# Patient Record
Sex: MALE | Race: WHITE | NOT HISPANIC OR LATINO | Employment: OTHER | URBAN - METROPOLITAN AREA
[De-identification: names, ages, dates, MRNs, and addresses within clinical notes are randomized per-mention and may not be internally consistent; named-entity substitution may affect disease eponyms.]

---

## 2022-05-23 ENCOUNTER — OFFICE VISIT (OUTPATIENT)
Dept: FAMILY MEDICINE CLINIC | Facility: CLINIC | Age: 67
End: 2022-05-23
Payer: COMMERCIAL

## 2022-05-23 VITALS
HEIGHT: 72 IN | HEART RATE: 61 BPM | TEMPERATURE: 98 F | OXYGEN SATURATION: 99 % | WEIGHT: 242.2 LBS | DIASTOLIC BLOOD PRESSURE: 82 MMHG | SYSTOLIC BLOOD PRESSURE: 130 MMHG | BODY MASS INDEX: 32.8 KG/M2

## 2022-05-23 DIAGNOSIS — I10 ESSENTIAL HYPERTENSION: ICD-10-CM

## 2022-05-23 DIAGNOSIS — Z12.12 ENCOUNTER FOR COLORECTAL CANCER SCREENING: ICD-10-CM

## 2022-05-23 DIAGNOSIS — H61.22 IMPACTED CERUMEN OF LEFT EAR: ICD-10-CM

## 2022-05-23 DIAGNOSIS — E78.49 OTHER HYPERLIPIDEMIA: ICD-10-CM

## 2022-05-23 DIAGNOSIS — Z12.11 ENCOUNTER FOR COLORECTAL CANCER SCREENING: ICD-10-CM

## 2022-05-23 DIAGNOSIS — Q23.1 BICUSPID AORTIC VALVE: Primary | ICD-10-CM

## 2022-05-23 DIAGNOSIS — Z11.59 NEED FOR HEPATITIS C SCREENING TEST: ICD-10-CM

## 2022-05-23 PROBLEM — Q23.81 BICUSPID AORTIC VALVE: Status: ACTIVE | Noted: 2019-12-03

## 2022-05-23 PROCEDURE — G0438 PPPS, INITIAL VISIT: HCPCS | Performed by: INTERNAL MEDICINE

## 2022-05-23 PROCEDURE — 3725F SCREEN DEPRESSION PERFORMED: CPT | Performed by: INTERNAL MEDICINE

## 2022-05-23 PROCEDURE — 1003F LEVEL OF ACTIVITY ASSESS: CPT | Performed by: INTERNAL MEDICINE

## 2022-05-23 PROCEDURE — 3288F FALL RISK ASSESSMENT DOCD: CPT | Performed by: INTERNAL MEDICINE

## 2022-05-23 PROCEDURE — 3008F BODY MASS INDEX DOCD: CPT | Performed by: INTERNAL MEDICINE

## 2022-05-23 PROCEDURE — 1170F FXNL STATUS ASSESSED: CPT | Performed by: INTERNAL MEDICINE

## 2022-05-23 PROCEDURE — 1036F TOBACCO NON-USER: CPT | Performed by: INTERNAL MEDICINE

## 2022-05-23 PROCEDURE — 1125F AMNT PAIN NOTED PAIN PRSNT: CPT | Performed by: INTERNAL MEDICINE

## 2022-05-23 PROCEDURE — 99204 OFFICE O/P NEW MOD 45 MIN: CPT | Performed by: INTERNAL MEDICINE

## 2022-05-23 PROCEDURE — 1101F PT FALLS ASSESS-DOCD LE1/YR: CPT | Performed by: INTERNAL MEDICINE

## 2022-05-23 RX ORDER — CRANBERRY FRUIT EXTRACT 200 MG
600 CAPSULE ORAL DAILY
COMMUNITY

## 2022-05-23 RX ORDER — LISINOPRIL AND HYDROCHLOROTHIAZIDE 25; 20 MG/1; MG/1
1 TABLET ORAL DAILY
COMMUNITY

## 2022-05-23 RX ORDER — DILTIAZEM HYDROCHLORIDE 300 MG/1
300 CAPSULE, EXTENDED RELEASE ORAL DAILY
COMMUNITY

## 2022-05-23 RX ORDER — LANOLIN ALCOHOL/MO/W.PET/CERES
1 CREAM (GRAM) TOPICAL DAILY
COMMUNITY

## 2022-05-23 RX ORDER — UREA 10 %
LOTION (ML) TOPICAL
COMMUNITY
End: 2022-05-23

## 2022-05-23 NOTE — PROGRESS NOTES
Lencho Fletcher is here for his Subsequent Wellness visit  Last Medicare Wellness visit information reviewed, patient interviewed and updates made to the record today  Health Risk Assessment:   Patient rates overall health as very good  Patient feels that their physical health rating is slightly better  Patient is very satisfied with their life  Eyesight was rated as slightly worse  Hearing was rated as slightly worse  Patient feels that their emotional and mental health rating is same  Patients states they are never, rarely angry  Patient states they are never, rarely unusually tired/fatigued  Pain experienced in the last 7 days has been some  Patient's pain rating has been 2/10  Patient states that he has experienced no weight loss or gain in last 6 months  Has intermittent left Achilles tendonitis  He tends to do stretches when it does bother him  Depression Screening:   PHQ-2 Score: 0      Fall Risk Screening: In the past year, patient has experienced: no history of falling in past year      Home Safety:  Patient does not have trouble with stairs inside or outside of their home  Patient has working smoke alarms and has working carbon monoxide detector  Home safety hazards include: none  Nutrition:   Current diet is Regular  Medications:   Patient is currently taking over-the-counter supplements  OTC medications include: see medication list  Patient is able to manage medications  Activities of Daily Living (ADLs)/Instrumental Activities of Daily Living (IADLs):   Walk and transfer into and out of bed and chair?: Yes  Dress and groom yourself?: Yes    Bathe or shower yourself?: Yes    Feed yourself?  Yes  Do your laundry/housekeeping?: Yes  Manage your money, pay your bills and track your expenses?: Yes  Make your own meals?: Yes    Do your own shopping?: Yes    Previous Hospitalizations:   Any hospitalizations or ED visits within the last 12 months?: No      Advance Care Planning:   Living will: No Durable POA for healthcare: No    Advanced directive counseling given: Yes    Five wishes given: Yes    End of Life Decisions reviewed with patient: Yes    Provider agrees with end of life decisions: Yes      Comments: He is not sure about what type of end of life care  Cognitive Screening:   Provider or family/friend/caregiver concerned regarding cognition?: No    PREVENTIVE SCREENINGS      Cardiovascular Screening:    General: Screening Current      Colorectal Cancer Screening:     General: Screening Current      Prostate Cancer Screening:    General: Screening Not Indicated      Osteoporosis Screening:    General: Screening Not Indicated      Abdominal Aortic Aneurysm (AAA) Screening:    Risk factors include: age between 73-69 yo        Lung Cancer Screening:     General: Screening Not Indicated    Screening, Brief Intervention, and Referral to Treatment (SBIRT)    Screening  Typical number of drinks in a day: 0  Typical number of drinks in a week: 0  Interpretation: Low risk drinking behavior  Single Item Drug Screening:  How often have you used an illegal drug (including marijuana) or a prescription medication for non-medical reasons in the past year? never    Single Item Drug Screen Score: 0  Interpretation: Negative screen for possible drug use disorder    Assessment/Plan:    Problem List Items Addressed This Visit        Cardiovascular and Mediastinum    Essential hypertension     Acceptable blood pressure control on diltiazem and lisinopril-hydrochlorothiazide  Check electrolytes and renal function  Relevant Medications    diltiazem (TIAZAC) 300 MG 24 hr capsule    lisinopril-hydrochlorothiazide (PRINZIDE,ZESTORETIC) 20-25 MG per tablet    Bicuspid aortic valve - Primary     No signs or symptoms of severe aortic stenosis  Continue follow-up with Cardiology             Relevant Medications    diltiazem (TIAZAC) 300 MG 24 hr capsule       Other    Other hyperlipidemia     He is on red yeast rice because he wants to avoid taking a statin  We will check fasting lipid panel and discuss statins further at his follow-up visit  Relevant Orders    Basic metabolic panel    Lipid panel      Other Visit Diagnoses     Encounter for colorectal cancer screening        Relevant Orders    Ambulatory referral for colonoscopy    Need for hepatitis C screening test        Relevant Orders    Hepatitis C Antibody ([facility])    Impacted cerumen of left ear            BMI Counseling: Body mass index is 32 85 kg/m²  The BMI is above normal  Nutrition recommendations include encouraging healthy choices of fruits and vegetables  Exercise recommendations include exercising 3-5 times per week  Rationale for BMI follow-up plan is due to patient being overweight or obese  Depression Screening and Follow-up Plan: Patient was screened for depression during today's encounter  They screened negative with a PHQ-2 score of 0  Left cerumen impaction-advised him to use Debrox 3 times a day for the next week and then return for re-evaluation and possible irrigation  We can discuss audiometry after his ear has been cleared  He has not had any of the COVID vaccines and cites that he does not feel COVID is all that serious of in condition and the MRI technology is reasons for not getting the vaccines  I stressed to him that it is clear that COVID is 10 times more leave full than influenza and that he is in a higher risk group because he has over 60, obese, and has a history of hypertension  He is not planning on getting a COVID vaccine, Pneumovax, nor Shingrix  Chief Complaint     Medicare Wellness Visit; BMI follow up due          HPI:    Quinton Adrian is a 78 y o  male reports for a new patient visit  Has lost 5 lbs since he retired  He tries to eat fruits and vegetables  He rides an exercise bike for 30 minutes without chest pain or shortness of breath  No PND or orthopnea    He reports that his wife says that he is heard of hearing and he is interested in getting his hearing checked  Past Medical History:   Diagnosis Date    Hypertension     Uncomplicated  Well-controlled overall  Past Surgical History:   Procedure Laterality Date    VASECTOMY          Family History   Problem Relation Age of Onset    Hypertension Mother     Arthritis Mother     Stroke Mother         Age [de-identified]    Completed Suicide  Father     No Known Problems Sister     Hypertension Brother     Hypertension Brother         Social History     Socioeconomic History    Marital status: /Civil Union     Spouse name: Not on file    Number of children: Not on file    Years of education: Not on file    Highest education level: Not on file   Occupational History    Not on file   Tobacco Use    Smoking status: Never Smoker    Smokeless tobacco: Never Used   Vaping Use    Vaping Use: Never used   Substance and Sexual Activity    Alcohol use: Not Currently    Drug use: Never    Sexual activity: Not on file   Other Topics Concern    Not on file   Social History Narrative    Retired 4/1/22  Used to work for [employer] in the [employer]   with 4 kids, 15 grandchildren     Social Determinants of Health     Financial Resource Strain: Not on file   Food Insecurity: Not on file   Transportation Needs: Not on file   Physical Activity: Not on file   Stress: Not on file   Social Connections: Not on file   Intimate Partner Violence: Not on file   Housing Stability: Not on file        Current Outpatient Medications   Medication Sig Dispense Refill    Arginine 1000 MG TABS Take 1,000 mg by mouth in the morning      diltiazem (TIAZAC) 300 MG 24 hr capsule Take 300 mg by mouth in the morning   glucosamine-chondroitin 500-400 MG tablet Take 1 tablet by mouth in the morning       lisinopril-hydrochlorothiazide (PRINZIDE,ZESTORETIC) 20-25 MG per tablet Take 1 tablet by mouth in the morning        Red Yeast Rice Extract 600 MG CAPS Take 600 mg by mouth in the morning       No current facility-administered medications for this visit  No Known Allergies    Review of Systems    /82 (BP Location: Left arm, Patient Position: Sitting)   Pulse 61   Temp 98 °F (36 7 °C)   Ht 6' (1 829 m)   Wt 110 kg (242 lb 3 2 oz)   SpO2 99%   BMI 32 85 kg/m²     General:  Well-developed, well-nourished, in no acute distress  Skin:  Warm, moist, no significant lesions  HENT:  Normocephalic, atraumatic, tympanic membranes clear bilaterally, left cerumen impaction which could not be removed with a plastic loop because he was too uncomfortable, right external canal and tympanic membrane was clear, nasal mucosa without lesions, oropharynx was clear without exudate  Eyes: PERRL, EOMI, conjunctivae normal   Neck:  No thyromegaly, thyroid nodules or cervical lymphadenopathy  Cardiac:  Regular rate and rhythm, 3/6 systolic murmur audible throughout the precordium but actually louder along the left sternal border  The murmur radiates into the carotids bilaterally  There was  no gallop, or rub  There is no JVD or HJR  Lungs:  Clear to auscultation and percussion  Abdomen:  Soft, nontender, normoactive bowel sounds, no palpable masses, no hepatosplenomegaly  Musculoskeletal:  No clubbing, cyanosis, or edema  Neurologic:  Cranial nerves 2-12 intact, motor was 5/5 in all major groups, DTRs 2+ throughout  Psychiatric:  Mood bright, appropriate affect and insight

## 2022-05-23 NOTE — PATIENT INSTRUCTIONS
Get over-the-counter Debrox and place three drops in the left ear 3 times a day for the next 2-3 weeks  Will reassess at that time

## 2022-05-24 NOTE — ASSESSMENT & PLAN NOTE
He is on red yeast rice because he wants to avoid taking a statin  We will check fasting lipid panel and discuss statins further at his follow-up visit

## 2022-05-24 NOTE — ASSESSMENT & PLAN NOTE
Acceptable blood pressure control on diltiazem and lisinopril-hydrochlorothiazide  Check electrolytes and renal function

## 2022-06-06 ENCOUNTER — LAB (OUTPATIENT)
Dept: LAB | Facility: CLINIC | Age: 67
End: 2022-06-06
Payer: COMMERCIAL

## 2022-06-06 DIAGNOSIS — Z11.59 NEED FOR HEPATITIS C SCREENING TEST: ICD-10-CM

## 2022-06-06 DIAGNOSIS — E78.49 OTHER HYPERLIPIDEMIA: ICD-10-CM

## 2022-06-06 LAB
ANION GAP SERPL CALCULATED.3IONS-SCNC: 5 MMOL/L (ref 4–13)
BUN SERPL-MCNC: 15 MG/DL (ref 5–25)
CALCIUM SERPL-MCNC: 9.5 MG/DL (ref 8.3–10.1)
CHLORIDE SERPL-SCNC: 103 MMOL/L (ref 100–108)
CHOLEST SERPL-MCNC: 180 MG/DL
CO2 SERPL-SCNC: 32 MMOL/L (ref 21–32)
CREAT SERPL-MCNC: 1.1 MG/DL (ref 0.6–1.3)
GFR SERPL CREATININE-BSD FRML MDRD: 69 ML/MIN/1.73SQ M
GLUCOSE P FAST SERPL-MCNC: 110 MG/DL (ref 65–99)
HCV AB SER QL: NORMAL
HDLC SERPL-MCNC: 38 MG/DL
LDLC SERPL CALC-MCNC: 122 MG/DL (ref 0–100)
NONHDLC SERPL-MCNC: 142 MG/DL
POTASSIUM SERPL-SCNC: 4.1 MMOL/L (ref 3.5–5.3)
SODIUM SERPL-SCNC: 140 MMOL/L (ref 136–145)
TRIGL SERPL-MCNC: 99 MG/DL

## 2022-06-06 PROCEDURE — 80061 LIPID PANEL: CPT

## 2022-06-06 PROCEDURE — 80048 BASIC METABOLIC PNL TOTAL CA: CPT

## 2022-06-06 PROCEDURE — 86803 HEPATITIS C AB TEST: CPT

## 2022-06-06 PROCEDURE — 36415 COLL VENOUS BLD VENIPUNCTURE: CPT

## 2022-06-13 ENCOUNTER — OFFICE VISIT (OUTPATIENT)
Dept: FAMILY MEDICINE CLINIC | Facility: CLINIC | Age: 67
End: 2022-06-13
Payer: COMMERCIAL

## 2022-06-13 VITALS
DIASTOLIC BLOOD PRESSURE: 76 MMHG | TEMPERATURE: 98 F | BODY MASS INDEX: 32.59 KG/M2 | HEIGHT: 72 IN | HEART RATE: 68 BPM | WEIGHT: 240.6 LBS | SYSTOLIC BLOOD PRESSURE: 130 MMHG | OXYGEN SATURATION: 98 %

## 2022-06-13 DIAGNOSIS — E78.49 OTHER HYPERLIPIDEMIA: ICD-10-CM

## 2022-06-13 DIAGNOSIS — H91.92 HEARING LOSS OF LEFT EAR, UNSPECIFIED HEARING LOSS TYPE: Primary | ICD-10-CM

## 2022-06-13 PROCEDURE — 1160F RVW MEDS BY RX/DR IN RCRD: CPT | Performed by: INTERNAL MEDICINE

## 2022-06-13 PROCEDURE — 3008F BODY MASS INDEX DOCD: CPT | Performed by: INTERNAL MEDICINE

## 2022-06-13 PROCEDURE — 1036F TOBACCO NON-USER: CPT | Performed by: INTERNAL MEDICINE

## 2022-06-13 PROCEDURE — 99214 OFFICE O/P EST MOD 30 MIN: CPT | Performed by: INTERNAL MEDICINE

## 2022-06-13 NOTE — PROGRESS NOTES
Assessment/Plan:    Hearing loss of left ear  We ordered an audiogram today  Other hyperlipidemia  Encouraged him to limit his intake of animal fat and fried foods  Increase intake of high-fiber foods  I suggested that since his 10 year risk is 17 6%, he should be on a pharmacologic statin  We did discuss that red yeast rice is similar to lovastatin but this is a very weak type of statin  Chief Complaint     Follow-up          Patient ID: Marlene Mustafa is a 78 y o  male who returns for routine follow up  He used the Debrox and flushed his left ear  He got a lot of wax out of his ear  However, he still feels he has some hearing loss in the left ear  He was under the impression that he was going to get an audiogram today  Objective:    /76 (BP Location: Right arm, Patient Position: Sitting)   Pulse 68   Temp 98 °F (36 7 °C)   Ht 6' (1 829 m)   Wt 109 kg (240 lb 9 6 oz)   SpO2 98%   BMI 32 63 kg/m²     Wt Readings from Last 3 Encounters:   06/13/22 109 kg (240 lb 9 6 oz)   05/23/22 110 kg (242 lb 3 2 oz)         Physical Exam    General:  Well-developed, well-nourished, in no acute distress  HEENT:  External canals were clear bilaterally, tympanic membranes normal bilaterally  Hearing was intact to finger rub on the right but he could barely hear it on the left

## 2022-06-13 NOTE — ASSESSMENT & PLAN NOTE
Encouraged him to limit his intake of animal fat and fried foods  Increase intake of high-fiber foods  I suggested that since his 10 year risk is 17 6%, he should be on a pharmacologic statin  We did discuss that red yeast rice is similar to lovastatin but this is a very weak type of statin

## 2022-06-13 NOTE — PATIENT INSTRUCTIONS
Fruits Serving size Total fiber (grams)*   Raspberries 1 cup 8 0   Pear, with skin 1 medium 5 5   Apple, with skin 1 medium 4 4   Banana 1 medium 3 1   Orange 1 medium 3 1   Strawberries (halves) 1 cup 3 0   Figs, dried 2 medium 1 6   Raisins 1 ounce (60 raisins) 1 0     Grains, cereal and pasta Serving size Total fiber (grams)*   Spaghetti, whole-wheat, cooked 1 cup 6 3   Barley, pearled, cooked 1 cup 6 0   Bran flakes 3/4 cup 5 5   Oat bran muffin 1 medium 5 2   Oatmeal, instant, cooked 1 cup 4 0   Popcorn, air-popped 3 cups 3 6   Brown rice, cooked 1 cup 3 5   Bread, rye 1 slice 1 9   Bread, whole-wheat 1 slice 1 9     Legumes, nuts and seeds Serving size Total fiber (grams)*   Split peas, boiled 1 cup 16 3   Lentils, boiled 1 cup 15 6   Black beans, boiled 1 cup 15 0   Lima beans, boiled 1 cup 13 2   Baked beans, vegetarian, canned, cooked 1 cup 10 4   Almonds 1 ounce (23 nuts) 3 5   Pistachio nuts 1 ounce (49 nuts) 2 9   Pecans 1 ounce (19 halves) 2 7     Vegetables Serving size Total fiber (grams)*   Artichoke, boiled 1 medium 10 3   Green peas, boiled 1 cup 8 8   Broccoli, boiled 1 cup 5 1   Turnip greens, boiled 1 cup 5 0   Critz sprouts, boiled 1 cup 4 1   Sweet corn, boiled 1 cup 3 6   Potato, with skin, baked 1 small 2 9   Tomato paste, canned 1/4 cup 2 7   Carrot, raw 1 medium 1 7

## 2022-08-22 DIAGNOSIS — I10 ESSENTIAL HYPERTENSION: Primary | ICD-10-CM

## 2022-08-22 RX ORDER — LISINOPRIL AND HYDROCHLOROTHIAZIDE 25; 20 MG/1; MG/1
1 TABLET ORAL DAILY
Qty: 90 TABLET | Refills: 0 | Status: SHIPPED | OUTPATIENT
Start: 2022-08-22

## 2022-08-22 RX ORDER — DILTIAZEM HYDROCHLORIDE 300 MG/1
300 CAPSULE, EXTENDED RELEASE ORAL DAILY
Qty: 90 CAPSULE | Refills: 0 | Status: SHIPPED | OUTPATIENT
Start: 2022-08-22

## 2022-09-23 ENCOUNTER — OFFICE VISIT (OUTPATIENT)
Dept: CARDIOLOGY CLINIC | Facility: CLINIC | Age: 67
End: 2022-09-23
Payer: COMMERCIAL

## 2022-09-23 VITALS
WEIGHT: 236 LBS | BODY MASS INDEX: 31.97 KG/M2 | DIASTOLIC BLOOD PRESSURE: 80 MMHG | HEART RATE: 66 BPM | HEIGHT: 72 IN | OXYGEN SATURATION: 96 % | SYSTOLIC BLOOD PRESSURE: 152 MMHG

## 2022-09-23 DIAGNOSIS — E78.2 MIXED HYPERLIPIDEMIA: ICD-10-CM

## 2022-09-23 DIAGNOSIS — Q23.1 BICUSPID AORTIC VALVE: Primary | ICD-10-CM

## 2022-09-23 DIAGNOSIS — I10 ESSENTIAL HYPERTENSION: ICD-10-CM

## 2022-09-23 DIAGNOSIS — I65.23 CAROTID STENOSIS, BILATERAL: ICD-10-CM

## 2022-09-23 PROCEDURE — 93000 ELECTROCARDIOGRAM COMPLETE: CPT | Performed by: INTERNAL MEDICINE

## 2022-09-23 PROCEDURE — 99204 OFFICE O/P NEW MOD 45 MIN: CPT | Performed by: INTERNAL MEDICINE

## 2022-09-23 RX ORDER — ROSUVASTATIN CALCIUM 5 MG/1
5 TABLET, COATED ORAL DAILY
Qty: 30 TABLET | Refills: 11 | Status: SHIPPED | OUTPATIENT
Start: 2022-09-23 | End: 2022-09-23 | Stop reason: CLARIF

## 2022-09-23 RX ORDER — ROSUVASTATIN CALCIUM 5 MG/1
5 TABLET, COATED ORAL DAILY
Qty: 30 TABLET | Refills: 2 | Status: SHIPPED | OUTPATIENT
Start: 2022-09-23 | End: 2022-10-31 | Stop reason: SDUPTHER

## 2022-09-23 RX ORDER — ASPIRIN 81 MG/1
TABLET ORAL
COMMUNITY

## 2022-09-23 NOTE — PROGRESS NOTES
Cardiology Office Visit    Tamela Standard  [id number]  1955    [facility]  [address] [facility] [id number] [phone]      Dear Madina Bowden MD,    I had the pleasure of seeing your patient at our [place] office today 9/23/2022. As you know he is a pleasant 78y.o. year old male with a medical history as described below. Patient previously followed with Dr. Dwight Ponce at [facility]. Reason for office visit: Establish care for bicuspid aortic valve. 1. Bicuspid aortic valve  -     Echo complete w/ contrast if indicated; Future; Expected date: 09/23/2022    2. Essential hypertension  -     Echo complete w/ contrast if indicated; Future; Expected date: 09/23/2022  -     POCT ECG    3. Mixed hyperlipidemia  -     Lipid Panel with Direct LDL reflex; Future  -     rosuvastatin (CRESTOR) 5 mg tablet; Take 1 tablet (5 mg total) by mouth daily    4. Carotid stenosis, bilateral  -     VAS carotid complete study; Future; Expected date: 09/23/2022  -     Lipid Panel with Direct LDL reflex; Future       Discussion/Plan:     Nikki Hernandez presents to the office to establish care for known bicuspid aortic valve. His last echocardiogram was done 11/29/2019 and showed mild aortic stenosis with mild aortic regurgitation. I have recommended updated echocardiogram to assess heart structure and function. Currently patient is asymptomatic. Carotid ultrasound 11/29/2019 showed 16-49% stenosis of both carotids. I have recommended an updated carotid ultrasound to reevaluate the degree of stenosis. Ideally LDL/bad cholesterol should be < 70 given carotid artery disease and aortic stenosis. LDL on lipid panel 6/6/2022 showed an LDL of 122. I have recommended the addition of rosuvastatin 5 mg daily. Repeat lipid panel in 8 weeks. Will adjust medication as needed for optimal lipid control.      Blood pressure is elevated on exam. This is his first office visit with me and he tells me that typically his blood pressure is controlled. Will continue current medications for now with close monitoring of blood pressure. We may need to adjust medications and/or doses for more optimal blood pressure control. PCP can also adjust medications if necessary. I discussed with the patient that his children should be screened with echocardiograms for bicuspid aortic valve. Will plan routine follow up in the office unless symptoms arise that warrant earlier assessment or if his echocardiogram and/or carotid ultrasound reveal significant change from prior. Patient was instructed to call with any questions or concerns. _______    HPI     Niels Hernandez presents to the office today to establish care for known bicuspid aortic valve. He has a past medical history of bicuspid aortic valve, hypertension, hyperlipidemia and carotid stenosis. Patient was following with Dr. Stefani Cole for many years at [facility]. He was last seen in 2019. He was following every 3 years. His last echocardiogram was 11/29/2019. His last carotid ultrasound was 11/29/2019.     9/23/2022: Patient presents to the office today to establish care. He is very active and denies limitation. He walked 3 miles this am with his dog. He goes to the gym 4 times a week. He denies any chest pain, shortness of breath, edema, palpitations, lightheadedness or dizziness. He feels his energy levels are normal. He does admit to snoring. Blood pressure typically in the 130's. Lipid panel reviewed from 6/6/2022 showed an . He is not currently on statin therapy.  ECG today shows normal sinus rhythm at 65 bpm.     Patient Active Problem List   Diagnosis    Essential hypertension    Bicuspid aortic valve    Mixed hyperlipidemia    Hearing loss of left ear    Carotid stenosis, bilateral     Past Medical History:   Diagnosis Date    Bicuspid aortic valve     Carotid stenosis     Hypertension Uncomplicated. Well-controlled overall. Social History     Socioeconomic History    Marital status: /Civil Union     Spouse name: Not on file    Number of children: Not on file    Years of education: Not on file    Highest education level: Not on file   Occupational History    Not on file   Tobacco Use    Smoking status: Never Smoker    Smokeless tobacco: Never Used   Vaping Use    Vaping Use: Never used   Substance and Sexual Activity    Alcohol use: Not Currently    Drug use: Never    Sexual activity: Not on file     Comment: Defer   Other Topics Concern    Not on file   Social History Narrative    Retired 4/1/22. Used to work for [employer] in the [employer].   with 4 kids, 15 grandchildren     Social Determinants of Health     Financial Resource Strain: Not on file   Food Insecurity: Not on file   Transportation Needs: Not on file   Physical Activity: Not on file   Stress: Not on file   Social Connections: Not on file   Intimate Partner Violence: Not on file   Housing Stability: Not on file      Family History   Problem Relation Age of Onset    Hypertension Mother     Arthritis Mother     Stroke Mother         Age 80    Completed Suicide  Father     No Known Problems Sister     Hypertension Brother     Hypertension Brother      Past Surgical History:   Procedure Laterality Date    VASECTOMY         Current Outpatient Medications:     rosuvastatin (CRESTOR) 5 mg tablet, Take 1 tablet (5 mg total) by mouth daily, Disp: 30 tablet, Rfl: 2    Arginine 1000 MG TABS, Take 1,000 mg by mouth in the morning, Disp: , Rfl:     aspirin (ECOTRIN LOW STRENGTH) 81 mg EC tablet, Take by mouth, Disp: , Rfl:     diltiazem (TIAZAC) 300 MG 24 hr capsule, Take 1 capsule (300 mg total) by mouth daily, Disp: 90 capsule, Rfl: 0    glucosamine-chondroitin 500-400 MG tablet, Take 1 tablet by mouth in the morning , Disp: , Rfl:     lisinopril-hydrochlorothiazide (PRINZIDE,ZESTORETIC) 20-25 MG per tablet, Take 1 tablet by mouth daily, Disp: 90 tablet, Rfl: 0       No Known Allergies      Cardiac Test Results:      ECG 9/23/2022: Normal sinus rhythm. Non specific ST-T wave flattening. Lipid panel 6/6/2022: C 180. T 99. H 38. L 122. Echocardiogram 11/29/2019: Ejection fraction of 66% with normal diastolic parameters for age, bicuspid aortic valve with mild stenosis and mild regurgitation along with mild mitral regurgitation and trace tricuspid regurgitation and normal pulmonary pressure estimate, unchanged. Carotid Duplex:11/29/2019: 16-49% stenosis of the right internal carotid artery with lower range of 16-49% stenosis of the left internal carotid artery, unchanged. Review of Systems:    Review of Systems   Constitutional: Negative for activity change, appetite change and fatigue. HENT: Negative for congestion, hearing loss, tinnitus and trouble swallowing. Eyes: Negative for visual disturbance. Respiratory: Negative for cough, chest tightness, shortness of breath and wheezing. Snores   Cardiovascular: Negative for chest pain, palpitations and leg swelling. Gastrointestinal: Negative for abdominal distention, abdominal pain, nausea and vomiting. Genitourinary: Negative for difficulty urinating. Musculoskeletal: Negative for arthralgias. Skin: Negative for rash. Neurological: Negative for dizziness, syncope and light-headedness. Hematological: Does not bruise/bleed easily. Psychiatric/Behavioral: Negative for confusion. The patient is not nervous/anxious. All other systems reviewed and are negative. Vitals:    09/23/22 1320 09/23/22 1403   BP: 168/90 152/80   Pulse: 66    SpO2: 96%    Weight: 107 kg (236 lb)    Height: 6' (1.829 m)      Vitals:    09/23/22 1320   Weight: 107 kg (236 lb)     Height: 6' (182.9 cm)     Physical Exam  Vitals reviewed. Constitutional:       Appearance: He is well-developed. HENT:      Head: Normocephalic and atraumatic.    Eyes: Conjunctiva/sclera: Conjunctivae normal.      Pupils: Pupils are equal, round, and reactive to light. Neck:      Vascular: No JVD. Cardiovascular:      Rate and Rhythm: Normal rate and regular rhythm. Heart sounds: Murmur heard. Harsh midsystolic murmur is present with a grade of 3/6 at the upper right sternal border radiating to the neck. No friction rub. No gallop. Pulmonary:      Effort: Pulmonary effort is normal.      Breath sounds: Normal breath sounds. Abdominal:      General: Bowel sounds are normal.      Palpations: Abdomen is soft. Musculoskeletal:      Cervical back: Normal range of motion. Skin:     General: Skin is warm and dry. Neurological:      Mental Status: He is alert and oriented to person, place, and time.    Psychiatric:         Behavior: Behavior normal.

## 2022-09-23 NOTE — PATIENT INSTRUCTIONS
I would recommend echocardiogram to assess heart structure and function. There is some degree of aortic stenosis which was previously described as mild. Carotid ultrasound showed 16-49% stenosis of both carotids. Ideally LDL/bad cholesterol should be < 70 given carotid artery disease and aortic stenosis. Start rosuvastatin 5 mg daily. Repeat lipid panel in 8 weeks. Blood pressure is elevated on exam.   Continue current medications for now. We may need to consider increasing doses. Your children should be screened with echocardiograms for bicuspid aortic valve.

## 2022-10-31 DIAGNOSIS — E78.2 MIXED HYPERLIPIDEMIA: ICD-10-CM

## 2022-10-31 NOTE — TELEPHONE ENCOUNTER
Pt Abdulaziz stating that he would like a 3 mon sply on his Rosuvastatin called into [facility] he did not say if this was a mail away for where the [facility] was    Please call pt [phone]

## 2022-11-03 RX ORDER — ROSUVASTATIN CALCIUM 5 MG/1
5 TABLET, COATED ORAL DAILY
Qty: 90 TABLET | Refills: 3 | Status: SHIPPED | OUTPATIENT
Start: 2022-11-03

## 2022-11-09 DIAGNOSIS — I10 ESSENTIAL HYPERTENSION: ICD-10-CM

## 2022-11-09 RX ORDER — LISINOPRIL AND HYDROCHLOROTHIAZIDE 25; 20 MG/1; MG/1
TABLET ORAL
Qty: 90 TABLET | Refills: 3 | Status: SHIPPED | OUTPATIENT
Start: 2022-11-09 | End: 2022-11-15

## 2022-11-09 RX ORDER — DILTIAZEM HYDROCHLORIDE 300 MG/1
CAPSULE, EXTENDED RELEASE ORAL
Qty: 90 CAPSULE | Refills: 3 | Status: SHIPPED | OUTPATIENT
Start: 2022-11-09 | End: 2022-11-14

## 2022-11-11 ENCOUNTER — TELEPHONE (OUTPATIENT)
Dept: FAMILY MEDICINE CLINIC | Facility: CLINIC | Age: 67
End: 2022-11-11

## 2022-11-11 DIAGNOSIS — Z12.11 ENCOUNTER FOR COLORECTAL CANCER SCREENING: Primary | ICD-10-CM

## 2022-11-11 DIAGNOSIS — Z12.12 ENCOUNTER FOR COLORECTAL CANCER SCREENING: Primary | ICD-10-CM

## 2022-11-11 NOTE — TELEPHONE ENCOUNTER
Patient called, he would like an order for Colonoscopy  He would like for this to be scheduled through Tona Meza at the [facility]   Can you please place order/referral

## 2022-11-12 DIAGNOSIS — I10 ESSENTIAL HYPERTENSION: ICD-10-CM

## 2022-11-14 RX ORDER — DILTIAZEM HYDROCHLORIDE 300 MG/1
CAPSULE, EXTENDED RELEASE ORAL
Qty: 90 CAPSULE | Refills: 0 | Status: SHIPPED | OUTPATIENT
Start: 2022-11-14

## 2022-11-15 DIAGNOSIS — I10 ESSENTIAL HYPERTENSION: ICD-10-CM

## 2022-11-15 RX ORDER — LISINOPRIL AND HYDROCHLOROTHIAZIDE 25; 20 MG/1; MG/1
TABLET ORAL
Qty: 90 TABLET | Refills: 0 | Status: SHIPPED | OUTPATIENT
Start: 2022-11-15

## 2022-11-16 NOTE — TELEPHONE ENCOUNTER
Attempted to call [facility]  at [phone], call can not be completed  Faxed orders to [address] [place] [address] at [phone]

## 2022-11-23 ENCOUNTER — HOSPITAL ENCOUNTER (OUTPATIENT)
Dept: NON INVASIVE DIAGNOSTICS | Facility: HOSPITAL | Age: 67
Discharge: HOME | End: 2022-11-23
Attending: INTERNAL MEDICINE

## 2022-11-23 VITALS
SYSTOLIC BLOOD PRESSURE: 148 MMHG | HEIGHT: 72 IN | HEART RATE: 70 BPM | DIASTOLIC BLOOD PRESSURE: 70 MMHG | WEIGHT: 236 LBS | BODY MASS INDEX: 31.97 KG/M2

## 2022-11-23 DIAGNOSIS — Q23.1 BICUSPID AORTIC VALVE: ICD-10-CM

## 2022-11-23 DIAGNOSIS — I65.23 CAROTID STENOSIS, BILATERAL: ICD-10-CM

## 2022-11-23 DIAGNOSIS — I10 ESSENTIAL HYPERTENSION: ICD-10-CM

## 2022-11-23 LAB
AORTIC ROOT: 4 CM
AORTIC VALVE MEAN VELOCITY: 25.8 M/S
APICAL FOUR CHAMBER EJECTION FRACTION: 79 %
ASCENDING AORTA: 3.8 CM
AV AREA BY CONTINUOUS VTI: 1.5 CM2
AV AREA PEAK VELOCITY: 1.3 CM2
AV LVOT MEAN GRADIENT: 6 MMHG
AV LVOT PEAK GRADIENT: 10 MMHG
AV MEAN GRADIENT: 31 MMHG
AV PEAK GRADIENT: 57 MMHG
AV VALVE AREA: 1.49 CM2
AV VELOCITY RATIO: 0.41
DOP CALC AO PEAK VEL: 3.77 M/S
DOP CALC AO VTI: 92.2 CM
DOP CALC LVOT AREA: 3.14 CM2
DOP CALC LVOT DIAMETER: 2 CM
DOP CALC LVOT PEAK VEL VTI: 43.68 CM
DOP CALC LVOT PEAK VEL: 1.55 M/S
DOP CALC LVOT STROKE INDEX: 59.4 ML/M2
DOP CALC LVOT STROKE VOLUME: 137.16 CM3
E WAVE DECELERATION TIME: 237 MS
FRACTIONAL SHORTENING: 41 % (ref 28–44)
INTERVENTRICULAR SEPTUM IN DIASTOLE (PARASTERNAL SHORT AXIS VIEW): 1.4 CM
INTERVENTRICULAR SEPTUM: 1.4 CM (ref 0.6–1.1)
LAAS-AP2: 21.5 CM2
LAAS-AP4: 17.3 CM2
LEFT ATRIUM SIZE: 4.1 CM
LEFT INTERNAL DIMENSION IN SYSTOLE: 2.6 CM (ref 2.1–4)
LEFT VENTRICULAR INTERNAL DIMENSION IN DIASTOLE: 4.4 CM (ref 3.5–6)
LEFT VENTRICULAR POSTERIOR WALL IN END DIASTOLE: 1.2 CM
LEFT VENTRICULAR STROKE VOLUME: 64 ML
LVSV (TEICH): 64 ML
MV E'TISSUE VEL-LAT: 10 CM/S
MV E'TISSUE VEL-SEP: 9 CM/S
MV PEAK A VEL: 0.55 M/S
MV PEAK E VEL: 80 CM/S
MV STENOSIS PRESSURE HALF TIME: 69 MS
MV VALVE AREA P 1/2 METHOD: 3.19 CM2
RIGHT ATRIAL 2D VOLUME: 32 ML
RIGHT ATRIUM AREA SYSTOLE A4C: 14.1 CM2
RIGHT VENTRICLE ID DIMENSION: 4.6 CM
SL CV LEFT ATRIUM LENGTH A2C: 5.8 CM
SL CV PED ECHO LEFT VENTRICLE DIASTOLIC VOLUME (MOD BIPLANE) 2D: 89 ML
SL CV PED ECHO LEFT VENTRICLE SYSTOLIC VOLUME (MOD BIPLANE) 2D: 25 ML
TR MAX PG: 33 MMHG
TR PEAK VELOCITY: 2.9 M/S
TRICUSPID VALVE PEAK REGURGITATION VELOCITY: 2.89 M/S

## 2022-12-08 ENCOUNTER — APPOINTMENT (OUTPATIENT)
Dept: LAB | Facility: HOSPITAL | Age: 67
End: 2022-12-08

## 2022-12-08 DIAGNOSIS — E78.49 OTHER HYPERLIPIDEMIA: ICD-10-CM

## 2022-12-08 DIAGNOSIS — E78.2 MIXED HYPERLIPIDEMIA: ICD-10-CM

## 2022-12-08 DIAGNOSIS — I65.23 CAROTID STENOSIS, BILATERAL: ICD-10-CM

## 2022-12-08 LAB
ANION GAP SERPL CALCULATED.3IONS-SCNC: 4 MMOL/L (ref 4–13)
BUN SERPL-MCNC: 15 MG/DL (ref 5–25)
CALCIUM SERPL-MCNC: 9.4 MG/DL (ref 8.3–10.1)
CHLORIDE SERPL-SCNC: 101 MMOL/L (ref 96–108)
CHOLEST SERPL-MCNC: 144 MG/DL
CO2 SERPL-SCNC: 34 MMOL/L (ref 21–32)
CREAT SERPL-MCNC: 1.02 MG/DL (ref 0.6–1.3)
GFR SERPL CREATININE-BSD FRML MDRD: 76 ML/MIN/1.73SQ M
GLUCOSE P FAST SERPL-MCNC: 104 MG/DL (ref 65–99)
HDLC SERPL-MCNC: 53 MG/DL
LDLC SERPL CALC-MCNC: 74 MG/DL (ref 0–100)
POTASSIUM SERPL-SCNC: 3.3 MMOL/L (ref 3.5–5.3)
SODIUM SERPL-SCNC: 139 MMOL/L (ref 135–147)
TRIGL SERPL-MCNC: 85 MG/DL

## 2022-12-12 ENCOUNTER — OFFICE VISIT (OUTPATIENT)
Dept: FAMILY MEDICINE CLINIC | Facility: CLINIC | Age: 67
End: 2022-12-12

## 2022-12-12 VITALS
BODY MASS INDEX: 31.99 KG/M2 | WEIGHT: 236.2 LBS | HEART RATE: 65 BPM | HEIGHT: 72 IN | SYSTOLIC BLOOD PRESSURE: 138 MMHG | OXYGEN SATURATION: 99 % | TEMPERATURE: 98 F | DIASTOLIC BLOOD PRESSURE: 68 MMHG

## 2022-12-12 DIAGNOSIS — Z23 NEEDS FLU SHOT: ICD-10-CM

## 2022-12-12 DIAGNOSIS — I10 ESSENTIAL HYPERTENSION: Primary | ICD-10-CM

## 2022-12-12 DIAGNOSIS — Q23.1 BICUSPID AORTIC VALVE: ICD-10-CM

## 2022-12-12 DIAGNOSIS — I65.23 BILATERAL CAROTID ARTERY STENOSIS: ICD-10-CM

## 2022-12-12 DIAGNOSIS — E78.2 MIXED HYPERLIPIDEMIA: ICD-10-CM

## 2022-12-12 PROBLEM — E66.09 CLASS 1 OBESITY DUE TO EXCESS CALORIES WITH BODY MASS INDEX (BMI) OF 32.0 TO 32.9 IN ADULT: Status: ACTIVE | Noted: 2022-12-12

## 2022-12-12 PROBLEM — Z86.006 PERSONAL HISTORY OF MELANOMA IN-SITU: Status: ACTIVE | Noted: 2020-03-03

## 2022-12-12 PROBLEM — E66.811 CLASS 1 OBESITY DUE TO EXCESS CALORIES WITH BODY MASS INDEX (BMI) OF 32.0 TO 32.9 IN ADULT: Status: ACTIVE | Noted: 2022-12-12

## 2022-12-12 PROBLEM — E87.6 HYPOKALEMIA: Status: ACTIVE | Noted: 2022-12-12

## 2022-12-12 NOTE — ASSESSMENT & PLAN NOTE
His potassium is 3 3  This probably secondary to the hydrochlorothiazide in the Zestoretic  I gave him a handout of high potassium foods to focus on  Check follow-up potassium  Consider potassium supplement

## 2022-12-12 NOTE — PROGRESS NOTES
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  No evidence of recurrence  Continue follow-up with dermatology  Bicuspid aortic valve  He remains asymptomatic and will continue to follow with cardiology  Bilateral carotid artery stenosis  Carotid stenoses of less than 50%  Continue risk factor modification  Essential hypertension  Acceptable blood pressure control on current regimen  Class 1 obesity due to excess calories with body mass index (BMI) of 32 0 to 32 9 in adult  Urged him to focus on a plant-based diet and limit the intake of animal protein  BMI Counseling: Body mass index is 32 03 kg/m²  The BMI is above normal  Nutrition recommendations include encouraging healthy choices of fruits and vegetables  Exercise recommendations include exercising 3-5 times per week  Rationale for BMI follow-up plan is due to patient being overweight or obese  Goes to gym 30 minutes of cardio, weights for 20-30 minutes  Depression Screening and Follow-up Plan: Patient was screened for depression during today's encounter  They screened negative with a PHQ-2 score of 0  Chief Complaint    Follow-up; BMI follow up due         Patient ID: Alice Gordon is a 78 y o  male who returns for routine follow up  He has no new concerns  He does go to the gym for 5 days/week and does cardio and weight training  He does not have any exertional chest pain, shortness of breath, no PND, orthopnea, or syncope  He did have a recent echocardiogram which confirmed that he has a bicuspid aortic valve and preserved LV function  His carotid Dopplers show stenoses less than 50% bilaterally    He does have a history of melanoma in situ and has been seeing his dermatologist every 6 months up until recently  Objective:    /68 (BP Location: Right arm)   Pulse 65   Temp 98 °F (36 7 °C)   Ht 6' (1 829 m)   Wt 107 kg (236 lb 3 2 oz)   SpO2 99%   BMI 32 03 kg/m²     Wt Readings from Last 3 Encounters:   12/12/22 107 kg (236 lb 3 2 oz)   11/23/22 107 kg (236 lb)   09/23/22 107 kg (236 lb)         Physical Exam    General:  Well-developed, well-nourished, in no acute distress  Cardiac:  Regular rate and rhythm, there is a 3/6 systolic crescendo decrescendo murmur loudest at the right upper sternal border, no gallop or rub  There is no JVD or HJR  Lungs:  Clear to auscultation and percussion  Abdomen:  Soft, nontender, normoactive bowel sounds, no palpable masses, no hepatosplenomegaly  Extremities:  No clubbing, cyanosis, or edema  Skin: Too numerous to count nevi on his back and trunk

## 2022-12-27 ENCOUNTER — OFFICE VISIT (OUTPATIENT)
Dept: CARDIOLOGY CLINIC | Facility: CLINIC | Age: 67
End: 2022-12-27

## 2022-12-27 VITALS
WEIGHT: 237 LBS | OXYGEN SATURATION: 97 % | HEART RATE: 69 BPM | DIASTOLIC BLOOD PRESSURE: 80 MMHG | HEIGHT: 72 IN | SYSTOLIC BLOOD PRESSURE: 160 MMHG | BODY MASS INDEX: 32.1 KG/M2

## 2022-12-27 DIAGNOSIS — Q23.1 BICUSPID AORTIC VALVE: Primary | ICD-10-CM

## 2022-12-27 DIAGNOSIS — E78.2 MIXED HYPERLIPIDEMIA: ICD-10-CM

## 2022-12-27 DIAGNOSIS — I65.23 BILATERAL CAROTID ARTERY STENOSIS: ICD-10-CM

## 2022-12-27 DIAGNOSIS — E87.6 HYPOKALEMIA: ICD-10-CM

## 2022-12-27 DIAGNOSIS — I10 ESSENTIAL HYPERTENSION: ICD-10-CM

## 2022-12-27 NOTE — PATIENT INSTRUCTIONS
Blood pressure is high today  I would recommend BP check in the office in the next week or two  Bring home cuff along to visit  Echocardiogram shows very minimal progression in aortic stenosis since last office visit  Will plan repeat echocardiogram 11/2023

## 2022-12-27 NOTE — PROGRESS NOTES
Cardiology Office Visit    Melvi Arias  [id number]  1955    [facility]  [address]  [facility] [address] [id number] [phone]      Dear Caitlyn Valencia MD,    I had the pleasure of seeing your patient at our [address] [facility] office today 12/27/2022  As you know he is a pleasant 78y o  year old male with a medical history as described below  Patient previously followed with Dr Cosme Arcos at [facility]  Reason for office visit: Follow up bicuspid aortic valve and hypertension  1  Bicuspid aortic valve    2  Essential hypertension    3  Mixed hyperlipidemia    4  Bilateral carotid artery stenosis       Blood pressure is high today  I would recommend BP check in the office in the next week or two  Bring home cuff along to visit  Echocardiogram shows very minimal progression in aortic stenosis since last office visit  Will plan repeat echocardiogram 11/2023  [place]     Shawn Correa presents to the office today to establish care for known bicuspid aortic valve  He has a past medical history of bicuspid aortic valve, hypertension, hyperlipidemia and carotid stenosis  Patient was following with Dr Ford Cottrell for many years at [facility]  He was last seen in 2019  He was following every 3 years  His last echocardiogram was 11/29/2019  His last carotid ultrasound was 11/29/2019 9/23/2022: Patient presents to the office today to establish care  He is very active and denies limitation  He goes to the gym 4 times a week  He denies any chest pain, shortness of breath, edema, palpitations, lightheadedness or dizziness  He feels his energy levels are normal  Blood pressure typically in the 130's  Lipid panel reviewed from 6/6/2022 showed an   He is not currently on statin therapy  ECG today shows normal sinus rhythm at 65 bpm      12/27/2022: Patient presents to the office today for follow up  No chest pain   Doing interval running on treadmill  No shortness of breath  No lightheadedness or dizziness  Echocardiogram 11/2022 reviewed  Lipids reviewed 12/8/2022 which looked great with the addition of rosuvastatin  Losing weight with dietary modification  Patient Active Problem List   Diagnosis   • Essential hypertension   • Bicuspid aortic valve   • Mixed hyperlipidemia   • Hearing loss of left ear   • Bilateral carotid artery stenosis   • Personal history of melanoma in-situ   • Hypokalemia   • Class 1 obesity due to excess calories with body mass index (BMI) of 32 0 to 32 9 in adult     Past Medical History:   Diagnosis Date   • Bicuspid aortic valve    • Carotid stenosis    • Hypertension     Uncomplicated  Well-controlled overall  Social History     Socioeconomic History   • Marital status: /Civil Union     Spouse name: Not on file   • Number of children: Not on file   • Years of education: Not on file   • Highest education level: Not on file   Occupational History   • Not on file   Tobacco Use   • Smoking status: Never   • Smokeless tobacco: Never   Vaping Use   • Vaping Use: Never used   Substance and Sexual Activity   • Alcohol use: Not Currently   • Drug use: Never   • Sexual activity: Not on file     Comment: Defer   Other Topics Concern   • Not on file   Social History Narrative    Retired 4/1/22  Used to work for [employer] in the [employer]    with 4 kids, 14 grandchildren     Social Determinants of Health     Financial Resource Strain: Not on file   Food Insecurity: Not on file   Transportation Needs: Not on file   Physical Activity: Not on file   Stress: Not on file   Social Connections: Not on file   Intimate Partner Violence: Not on file   Housing Stability: Not on file      Family History   Problem Relation Age of Onset   • Hypertension Mother    • Arthritis Mother    • Stroke Mother         Age [de-identified]   • Completed Suicide  Father    • No Known Problems Sister    • Hypertension Brother    • Hypertension Brother      Past Surgical History:   Procedure Laterality Date   • VASECTOMY         Current Outpatient Medications:   •  Arginine 1000 MG TABS, Take 1,000 mg by mouth in the morning, Disp: , Rfl:   •  aspirin (ECOTRIN LOW STRENGTH) 81 mg EC tablet, Take by mouth, Disp: , Rfl:   •  diltiazem (TIAZAC) 300 MG 24 hr capsule, TAKE 1 CAPSULE DAILY, Disp: 90 capsule, Rfl: 0  •  glucosamine-chondroitin 500-400 MG tablet, Take 1 tablet by mouth in the morning , Disp: , Rfl:   •  lisinopril-hydrochlorothiazide (PRINZIDE,ZESTORETIC) 20-25 MG per tablet, TAKE 1 TABLET DAILY, Disp: 90 tablet, Rfl: 0  •  rosuvastatin (CRESTOR) 5 mg tablet, Take 1 tablet (5 mg total) by mouth daily, Disp: 90 tablet, Rfl: 3       No Known Allergies      Cardiac Test Results:      ECG 9/23/2022: Normal sinus rhythm  Non specific ST-T wave flattening  Lipid panel 6/6/2022: C 180  T 99  H 38  L 122  Echocardiogram 11/29/2019: Ejection fraction of 66% with normal diastolic parameters for age, bicuspid aortic valve with mild stenosis and mild regurgitation along with mild mitral regurgitation and trace tricuspid regurgitation and normal pulmonary pressure estimate, unchanged  Carotid Duplex:11/29/2019: 16-49% stenosis of the right internal carotid artery with lower range of 16-49% stenosis of the left internal carotid artery, unchanged  Review of Systems:    Review of Systems   Constitutional: Negative for activity change, appetite change and fatigue  HENT: Negative for congestion, hearing loss, tinnitus and trouble swallowing  Eyes: Negative for visual disturbance  Respiratory: Negative for cough, chest tightness, shortness of breath and wheezing  Snores   Cardiovascular: Negative for chest pain, palpitations and leg swelling  Gastrointestinal: Negative for abdominal distention, abdominal pain, nausea and vomiting  Genitourinary: Negative for difficulty urinating     Musculoskeletal: Negative for arthralgias  Skin: Negative for rash  Neurological: Negative for dizziness, syncope and light-headedness  Hematological: Does not bruise/bleed easily  Psychiatric/Behavioral: Negative for confusion  The patient is not nervous/anxious  All other systems reviewed and are negative  Vitals:    12/27/22 1308   BP: (!) 182/100   Pulse: 69   SpO2: 97%   Weight: 108 kg (237 lb)   Height: 6' (1 829 m)     Vitals:    12/27/22 1308   Weight: 108 kg (237 lb)     Height: 6' (182 9 cm)     Physical Exam  Vitals reviewed  Constitutional:       Appearance: He is well-developed  HENT:      Head: Normocephalic and atraumatic  Eyes:      Conjunctiva/sclera: Conjunctivae normal       Pupils: Pupils are equal, round, and reactive to light  Neck:      Vascular: No JVD  Cardiovascular:      Rate and Rhythm: Normal rate and regular rhythm  Heart sounds: Murmur heard  Harsh midsystolic murmur is present with a grade of 3/6 at the upper right sternal border radiating to the neck  No friction rub  No gallop  Pulmonary:      Effort: Pulmonary effort is normal       Breath sounds: Normal breath sounds  Abdominal:      General: Bowel sounds are normal       Palpations: Abdomen is soft  Musculoskeletal:      Cervical back: Normal range of motion  Skin:     General: Skin is warm and dry  Neurological:      Mental Status: He is alert and oriented to person, place, and time     Psychiatric:         Behavior: Behavior normal

## 2023-01-12 ENCOUNTER — CLINICAL SUPPORT (OUTPATIENT)
Dept: CARDIOLOGY CLINIC | Facility: CLINIC | Age: 68
End: 2023-01-12

## 2023-01-12 VITALS
HEART RATE: 55 BPM | SYSTOLIC BLOOD PRESSURE: 130 MMHG | TEMPERATURE: 97.2 F | OXYGEN SATURATION: 99 % | DIASTOLIC BLOOD PRESSURE: 70 MMHG | WEIGHT: 231.2 LBS | HEIGHT: 72 IN | BODY MASS INDEX: 31.31 KG/M2

## 2023-01-12 DIAGNOSIS — I10 PRIMARY HYPERTENSION: Primary | ICD-10-CM

## 2023-02-15 ENCOUNTER — TELEPHONE (OUTPATIENT)
Dept: CARDIOLOGY CLINIC | Facility: CLINIC | Age: 68
End: 2023-02-15

## 2023-04-27 ENCOUNTER — APPOINTMENT (OUTPATIENT)
Dept: LAB | Facility: HOSPITAL | Age: 68
End: 2023-04-27

## 2023-04-27 DIAGNOSIS — E87.6 HYPOKALEMIA: ICD-10-CM

## 2023-04-27 LAB
ANION GAP SERPL CALCULATED.3IONS-SCNC: 5 MMOL/L (ref 4–13)
BUN SERPL-MCNC: 18 MG/DL (ref 5–25)
CALCIUM SERPL-MCNC: 9.2 MG/DL (ref 8.4–10.2)
CHLORIDE SERPL-SCNC: 103 MMOL/L (ref 96–108)
CO2 SERPL-SCNC: 33 MMOL/L (ref 21–32)
CREAT SERPL-MCNC: 1.15 MG/DL (ref 0.6–1.3)
GFR SERPL CREATININE-BSD FRML MDRD: 65 ML/MIN/1.73SQ M
GLUCOSE P FAST SERPL-MCNC: 104 MG/DL (ref 65–99)
POTASSIUM SERPL-SCNC: 4.2 MMOL/L (ref 3.5–5.3)
SODIUM SERPL-SCNC: 141 MMOL/L (ref 135–147)

## 2023-05-02 ENCOUNTER — OFFICE VISIT (OUTPATIENT)
Dept: CARDIOLOGY CLINIC | Facility: CLINIC | Age: 68
End: 2023-05-02
Payer: COMMERCIAL

## 2023-05-02 VITALS
BODY MASS INDEX: 31.75 KG/M2 | DIASTOLIC BLOOD PRESSURE: 80 MMHG | HEART RATE: 60 BPM | SYSTOLIC BLOOD PRESSURE: 180 MMHG | OXYGEN SATURATION: 98 % | HEIGHT: 72 IN | WEIGHT: 234.4 LBS

## 2023-05-02 DIAGNOSIS — I65.23 BILATERAL CAROTID ARTERY STENOSIS: ICD-10-CM

## 2023-05-02 DIAGNOSIS — E87.6 HYPOKALEMIA: ICD-10-CM

## 2023-05-02 DIAGNOSIS — Q23.1 BICUSPID AORTIC VALVE: Primary | ICD-10-CM

## 2023-05-02 DIAGNOSIS — I10 ESSENTIAL HYPERTENSION: ICD-10-CM

## 2023-05-02 DIAGNOSIS — E78.2 MIXED HYPERLIPIDEMIA: ICD-10-CM

## 2023-05-02 PROCEDURE — 99214 OFFICE O/P EST MOD 30 MIN: CPT | Performed by: INTERNAL MEDICINE

## 2023-05-02 NOTE — PROGRESS NOTES
Cardiology Office Visit    Laure Neely  [id number]  1955    [facility]  [address] [facility] [id number] [phone]      Dear Brayan Kam MD,    I had the pleasure of seeing your patient at our [place] office today 5/2/2023. As you know he is a pleasant 80y.o. year old male with a medical history as described below. Patient previously followed with Dr. Saint Landmark at [facility]. Reason for office visit: Follow up bicuspid aortic valve and hypertension. 1. Bicuspid aortic valve    2. Essential hypertension    3. Mixed hyperlipidemia    4. Bilateral carotid artery stenosis    5. Hypokalemia         Plan:   Blood pressure is high today. Echocardiogram last year showed moderate concentric LVH and I suspect his blood pressure needs to be treated more aggressively although he has been reluctant previously   I would recommend BP check next week. Will plan repeat echocardiogram 11/2023. Cardiac MRI may be warranted pending results given concern for the degree of LVH as well as the  dilated aortic root and ascending aorta.   _______________________      HPI     Kristina Hays presents to the office today to establish care for known bicuspid aortic valve. He has a past medical history of bicuspid aortic valve, hypertension, hyperlipidemia and carotid stenosis. Patient was following with Dr. Sandra Rivera for many years at [facility]. He was last seen in 2019. He was following every 3 years. His last echocardiogram was 11/29/2019. His last carotid ultrasound was 11/29/2019.     9/23/2022: Patient presents to the office today to establish care. He is very active and denies limitation. He goes to the gym 4 times a week. He denies any chest pain, shortness of breath, edema, palpitations, lightheadedness or dizziness. He feels his energy levels are normal. Blood pressure typically in the 130's.  Lipid panel reviewed from 6/6/2022 showed an . He is not currently on statin therapy. ECG today shows normal sinus rhythm at 65 bpm.     12/27/2022: Patient presents to the office today for follow up. No chest pain. Doing interval running on treadmill. No shortness of breath. No lightheadedness or dizziness. Echocardiogram 11/2022 reviewed. Lipids reviewed 12/8/2022 which looked great with the addition of rosuvastatin. Losing weight with dietary modification. 5/2/2023: Nely Cazares presents to the office today for follow up. He continues to exercise regularly without limitation. He is doing both interval running and weights. Carotid ultrasound 11/23/2022 showed < 50% bilateral ICA stenosis. He denies any chest pain or shortness of breath. No lightheadedness or dizziness. Blood pressure is high in the office today. 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 Well-controlled overall. Social History     Socioeconomic History    Marital status: /Civil Union     Spouse name: Not on file    Number of children: Not on file    Years of education: Not on file    Highest education level: Not on file   Occupational History    Not on file   Tobacco Use    Smoking status: Never    Smokeless tobacco: Never   Vaping Use    Vaping Use: Never used   Substance and Sexual Activity    Alcohol use: Not Currently    Drug use: Never    Sexual activity: Not on file     Comment: Defer   Other Topics Concern    Not on file   Social History Narrative    Retired 4/1/22. Used to work for [employer] in the [employer].   with 4 kids, 14 grandchildren     Social Determinants of Health     Financial Resource Strain: Not on file   Food Insecurity: Not on file   Transportation Needs: Not on file   Physical Activity: Not on file   Stress: Not on file   Social Connections: Not on file   Intimate Partner Violence: Not on file   Housing Stability: Not on file      Family History   Problem Relation Age of Onset    Hypertension Mother     Arthritis Mother     Stroke Mother         Age 80    Completed Suicide  Father     No Known Problems Sister     Hypertension Brother     Hypertension Brother      Past Surgical History:   Procedure Laterality Date    VASECTOMY         Current Outpatient Medications:     Arginine 1000 MG TABS, Take 1,000 mg by mouth in the morning, Disp: , Rfl:     aspirin (ECOTRIN LOW STRENGTH) 81 mg EC tablet, Take by mouth, Disp: , Rfl:     diltiazem (TIAZAC) 300 MG 24 hr capsule, TAKE 1 CAPSULE DAILY, Disp: 90 capsule, Rfl: 0    glucosamine-chondroitin 500-400 MG tablet, Take 1 tablet by mouth in the morning , Disp: , Rfl:     lisinopril-hydrochlorothiazide (PRINZIDE,ZESTORETIC) 20-25 MG per tablet, TAKE 1 TABLET DAILY, Disp: 90 tablet, Rfl: 0    rosuvastatin (CRESTOR) 5 mg tablet, Take 1 tablet (5 mg total) by mouth daily, Disp: 90 tablet, Rfl: 3       No Known Allergies      Cardiac Test Results:      Echocardiogram 11/23/2022:    Left Ventricle: Moderate concentric left ventricular hypertrophy with prominence of the basal interventricular septum without outflow tract gradient identified. Hyperdynamic LV systolic function with estimated ejection fraction 70%. No regional wall motion abnormality identified. Normal end-diastolic pressures estimated with mild diastolic relaxation abnormality    Right Ventricle: Normal right ventricular size and function. Aortic Valve: Three distinct aortic leaflets are not well seen. There is particular thickening of the non coronary cusp. Historically valve has been described as bicuspid. Leaflet excursion appears reduced. There is a measured transaortic velocity of 3.6 m/sec corresponding to peak mean gradients of 52 in 29 mmHg respectively. Suspect moderate aortic stenosis. There is mild central insufficiency. Mitral Valve: Mitral annular calcification with mild mitral leaflet thickening, preserved leaflet excursion and mild insufficiency. Tricuspid Valve: Normal-appearing tricuspid leaflets with preserved excursion and mild regurgitation. Measured tricuspid regurgitant velocity of 2.9 m/sec corresponds a gradient of 33 mmHg. With addition of 7 mm Hg for estimated right atrial pressure, estimated pulmonary pressure is moderately increased 40 mmHg. Aorta: The aortic root is mildly dilated. The ascending aorta is mildly dilated. ECG 9/23/2022: Normal sinus rhythm. Non specific ST-T wave flattening. Lipid panel 6/6/2022: C 180. T 99. H 38. L 122. Echocardiogram 11/29/2019: Ejection fraction of 66% with normal diastolic parameters for age, bicuspid aortic valve with mild stenosis and mild regurgitation along with mild mitral regurgitation and trace tricuspid regurgitation and normal pulmonary pressure estimate, unchanged. Carotid Duplex:11/29/2019: 16-49% stenosis of the right internal carotid artery with lower range of 16-49% stenosis of the left internal carotid artery, unchanged. Review of Systems:    Review of Systems   Constitutional: Negative for activity change, appetite change and fatigue. HENT: Negative for congestion, hearing loss, tinnitus and trouble swallowing. Eyes: Negative for visual disturbance. Respiratory: Negative for cough, chest tightness, shortness of breath and wheezing. Snores   Cardiovascular: Negative for chest pain, palpitations and leg swelling. Gastrointestinal: Negative for abdominal distention, abdominal pain, nausea and vomiting. Genitourinary: Negative for difficulty urinating.    Musculoskeletal: Negative for arthralgias. Skin: Negative for rash. Neurological: Negative for dizziness, syncope and light-headedness. Hematological: Does not bruise/bleed easily. Psychiatric/Behavioral: Negative for confusion. The patient is not nervous/anxious. All other systems reviewed and are negative. Vitals:    05/02/23 1513 05/02/23 1614   BP: 158/80 (!) 180/80   Pulse: 60    SpO2: 98%    Weight: 106 kg (234 lb 6.4 oz)    Height: 6' (1.829 m)      Vitals:    05/02/23 1513   Weight: 106 kg (234 lb 6.4 oz)     Height: 6' (182.9 cm)     Body mass index is 31.79 kg/m². Wt Readings from Last 3 Encounters:   05/02/23 106 kg (234 lb 6.4 oz)   01/12/23 105 kg (231 lb 3.2 oz)   12/27/22 108 kg (237 lb)       Physical Exam  Vitals reviewed. Constitutional:       Appearance: He is well-developed. HENT:      Head: Normocephalic and atraumatic. Eyes:      Conjunctiva/sclera: Conjunctivae normal.      Pupils: Pupils are equal, round, and reactive to light. Neck:      Vascular: No JVD. Cardiovascular:      Rate and Rhythm: Normal rate and regular rhythm. Heart sounds: Murmur heard. Harsh midsystolic murmur is present with a grade of 3/6 at the upper right sternal border radiating to the neck. No friction rub. No gallop. Pulmonary:      Effort: Pulmonary effort is normal.      Breath sounds: Normal breath sounds. Abdominal:      General: Bowel sounds are normal.      Palpations: Abdomen is soft. Musculoskeletal:      Cervical back: Normal range of motion. Skin:     General: Skin is warm and dry. Neurological:      Mental Status: He is alert and oriented to person, place, and time.    Psychiatric:         Behavior: Behavior normal.

## 2023-05-02 NOTE — PATIENT INSTRUCTIONS
Bring blood pressure cuff to the hospital next week. Blood pressure is high today. Call me with any change in symptoms.

## 2023-05-10 DIAGNOSIS — I10 ESSENTIAL HYPERTENSION: ICD-10-CM

## 2023-05-11 ENCOUNTER — TELEPHONE (OUTPATIENT)
Dept: CARDIOLOGY CLINIC | Facility: CLINIC | Age: 68
End: 2023-05-11

## 2023-05-11 RX ORDER — DILTIAZEM HYDROCHLORIDE 300 MG/1
CAPSULE, EXTENDED RELEASE ORAL
Qty: 90 CAPSULE | Refills: 0 | Status: SHIPPED | OUTPATIENT
Start: 2023-05-11

## 2023-05-11 NOTE — TELEPHONE ENCOUNTER
Called and spoke with patient he is to go to [facility]  and ask for Dr Elise Rodriguez to be paged and get his BP check with her today

## 2023-05-13 DIAGNOSIS — I10 ESSENTIAL HYPERTENSION: ICD-10-CM

## 2023-05-15 RX ORDER — LISINOPRIL AND HYDROCHLOROTHIAZIDE 25; 20 MG/1; MG/1
TABLET ORAL
Qty: 90 TABLET | Refills: 0 | Status: SHIPPED | OUTPATIENT
Start: 2023-05-15

## 2023-05-17 ENCOUNTER — CLINICAL SUPPORT (OUTPATIENT)
Dept: CARDIOLOGY CLINIC | Facility: CLINIC | Age: 68
End: 2023-05-17

## 2023-05-17 ENCOUNTER — TELEPHONE (OUTPATIENT)
Dept: CARDIOLOGY CLINIC | Facility: CLINIC | Age: 68
End: 2023-05-17

## 2023-05-17 VITALS
HEART RATE: 66 BPM | WEIGHT: 235 LBS | DIASTOLIC BLOOD PRESSURE: 88 MMHG | HEIGHT: 72 IN | TEMPERATURE: 97.9 F | OXYGEN SATURATION: 95 % | BODY MASS INDEX: 31.83 KG/M2 | SYSTOLIC BLOOD PRESSURE: 170 MMHG

## 2023-05-17 DIAGNOSIS — I10 ESSENTIAL HYPERTENSION: ICD-10-CM

## 2023-05-17 DIAGNOSIS — I10 ESSENTIAL HYPERTENSION: Primary | ICD-10-CM

## 2023-05-17 NOTE — TELEPHONE ENCOUNTER
I reviewed patient's BP check from today which shows elevated blood pressure  Home cuff is within 10 points of the manual reading  If his home readings are staying > 140/90 I suggest an adjustment to his lisinopril dose to 40 mg  Thank you!

## 2023-05-22 RX ORDER — LISINOPRIL 20 MG/1
20 TABLET ORAL DAILY
Qty: 90 TABLET | Refills: 3 | Status: SHIPPED | OUTPATIENT
Start: 2023-05-22

## 2023-05-22 NOTE — TELEPHONE ENCOUNTER
I sent an additional 20 mg to take with his lisinopril/HCTZ  Notify us if persistently > 140/90    Thank you

## 2023-05-22 NOTE — TELEPHONE ENCOUNTER
Pt aware, states he just got a new cuff, BP ranging 160/ over high 80s  Agreeable to increasing medication  Please send medication to [facility]

## 2023-06-12 ENCOUNTER — TELEPHONE (OUTPATIENT)
Dept: CARDIOLOGY CLINIC | Facility: CLINIC | Age: 68
End: 2023-06-12

## 2023-06-12 NOTE — TELEPHONE ENCOUNTER
I would recommend we transition off lisinopril to irbesartan/HCTZ combo  If he is agreeable please let me know and I will change  Thank you!

## 2023-06-12 NOTE — TELEPHONE ENCOUNTER
Patient called and stated with the additional lisinopril (ZESTRIL) 20 mg tablet Take 1 tablet (20 mg total) by mouth daily patients BP is holding at 160/80 with no symptoms    Patient can be reached at

## 2023-06-15 DIAGNOSIS — I10 ESSENTIAL HYPERTENSION: Primary | ICD-10-CM

## 2023-06-15 RX ORDER — IRBESARTAN AND HYDROCHLOROTHIAZIDE 300; 12.5 MG/1; MG/1
1 TABLET, FILM COATED ORAL DAILY
Qty: 90 TABLET | Refills: 3 | Status: SHIPPED | OUTPATIENT
Start: 2023-06-15

## 2023-06-15 NOTE — TELEPHONE ENCOUNTER
Called and spoke with patient he is agreeable to changing medication    Please send to [facility]

## 2023-06-15 NOTE — TELEPHONE ENCOUNTER
Please let him know that I sent irbesartan 300 mg/HCTZ 12 5 mg to [facility] mail order  Please have him call with updated blood pressure readings a few days after he starts the new medication  Please make sure that he knows to stop the lisinopril combo with HCTZ as well as the extra lisinopril he was started on  Thank you

## 2023-07-18 ENCOUNTER — OFFICE VISIT (OUTPATIENT)
Dept: FAMILY MEDICINE CLINIC | Facility: CLINIC | Age: 68
End: 2023-07-18
Payer: COMMERCIAL

## 2023-07-18 VITALS
OXYGEN SATURATION: 97 % | DIASTOLIC BLOOD PRESSURE: 74 MMHG | WEIGHT: 231 LBS | SYSTOLIC BLOOD PRESSURE: 156 MMHG | HEIGHT: 72 IN | HEART RATE: 68 BPM | BODY MASS INDEX: 31.29 KG/M2

## 2023-07-18 DIAGNOSIS — Q23.1 BICUSPID AORTIC VALVE: ICD-10-CM

## 2023-07-18 DIAGNOSIS — I10 ESSENTIAL HYPERTENSION: ICD-10-CM

## 2023-07-18 DIAGNOSIS — E78.2 MIXED HYPERLIPIDEMIA: Primary | ICD-10-CM

## 2023-07-18 DIAGNOSIS — E66.09 CLASS 1 OBESITY DUE TO EXCESS CALORIES WITHOUT SERIOUS COMORBIDITY WITH BODY MASS INDEX (BMI) OF 32.0 TO 32.9 IN ADULT: ICD-10-CM

## 2023-07-18 DIAGNOSIS — I65.23 BILATERAL CAROTID ARTERY STENOSIS: ICD-10-CM

## 2023-07-18 DIAGNOSIS — H91.92 HEARING LOSS OF LEFT EAR, UNSPECIFIED HEARING LOSS TYPE: ICD-10-CM

## 2023-07-18 DIAGNOSIS — Z12.5 ENCOUNTER FOR PROSTATE CANCER SCREENING: ICD-10-CM

## 2023-07-18 PROCEDURE — 99214 OFFICE O/P EST MOD 30 MIN: CPT | Performed by: INTERNAL MEDICINE

## 2023-07-18 PROCEDURE — G0439 PPPS, SUBSEQ VISIT: HCPCS | Performed by: INTERNAL MEDICINE

## 2023-07-18 RX ORDER — CHLORAL HYDRATE 500 MG
CAPSULE ORAL
COMMUNITY
Start: 2023-07-18

## 2023-07-18 NOTE — PROGRESS NOTES
Assessment and Plan:     Problem List Items Addressed This Visit        Cardiovascular and Mediastinum    Essential hypertension     His blood pressure is not well controlled despite being switch over to the irbesartan-hydrochlorothiazide. I would favor switching out diltiazem for amlodipine but will check with his cardiologist, Dr. Marci Abraham. I will let him know after we have that discussion. Bicuspid aortic valve     Asymptomatic. Continue to monitor and continue follow-up with cardiology. Bilateral carotid artery stenosis     He is now on rosuvastatin. Continue blood pressure management. Nervous and Auditory    Hearing loss of left ear     External ear canals were clear today. Follow-up with ENT. Other    Mixed hyperlipidemia - Primary    Class 1 obesity due to excess calories with body mass index (BMI) of 32.0 to 32.9 in adult     Encouraged him to continue exercising. Other Visit Diagnoses     Encounter for prostate cancer screening        Relevant Orders    PSA, Total Screen        BMI Counseling: Body mass index is 31.33 kg/m². The BMI is above normal. Exercise recommendations include exercising 3-5 times per week and strength training exercises. Rationale for BMI follow-up plan is due to patient being overweight or obese. Goes to [employer]. Does intervals on treadmill, stationary bike, 30 minutes of weight training. Preventive health issues were discussed with patient, and age appropriate screening tests were ordered as noted in patient's After Visit Summary. Personalized health advice and appropriate referrals for health education or preventive services given if needed, as noted in patient's After Visit Summary. History of Present Illness:     Patient presents for a Medicare Wellness Visit. He has seen his cardiologist who started him on rosuvastatin.  Home BPs have been 140-160s since being switched to irbesartan-hydrochlorothiazide 330/12.5 mg on 6/15/23. HPI   Patient Care Team:  Kait Schilling MD as PCP - General (Internal Medicine)     Review of Systems:     Review of Systems     Problem List:     Patient Active Problem List   Diagnosis   • Essential hypertension   • Bicuspid aortic valve   • Mixed hyperlipidemia   • Hearing loss of left ear   • Bilateral carotid artery stenosis   • Personal history of melanoma in-situ   • Hypokalemia   • Class 1 obesity due to excess calories with body mass index (BMI) of 32.0 to 32.9 in adult      Past Medical and Surgical History:     Past Medical History:   Diagnosis Date   • Bicuspid aortic valve    • Carotid stenosis    • Hypertension     Uncomplicated. Well-controlled overall. Past Surgical History:   Procedure Laterality Date   • VASECTOMY        Family History:     Family History   Problem Relation Age of Onset   • Hypertension Mother    • Arthritis Mother    • Stroke Mother         Age 80   • Completed Suicide  Father    • No Known Problems Sister    • Hypertension Brother    • Hypertension Brother    • Prostate cancer Paternal Uncle       Social History:     Social History     Socioeconomic History   • Marital status: /Civil Union     Spouse name: None   • Number of children: None   • Years of education: None   • Highest education level: None   Occupational History   • None   Tobacco Use   • Smoking status: Never   • Smokeless tobacco: Never   Vaping Use   • Vaping Use: Never used   Substance and Sexual Activity   • Alcohol use: Not Currently   • Drug use: Never   • Sexual activity: None     Comment: Defer   Other Topics Concern   • None   Social History Narrative    Retired 4/1/22. Used to work for [email or web address] in the [employer].   with 4 kids, 14 grandchildren     Social Determinants of Health     Financial Resource Strain: Low Risk  (7/18/2023)    Overall Financial Resource Strain (CARDIA)    • Difficulty of Paying Living Expenses: Not hard at all   Food Insecurity: Not on file Transportation Needs: No Transportation Needs (7/18/2023)    PRAPARE - Transportation    • Lack of Transportation (Medical): No    • Lack of Transportation (Non-Medical): No   Physical Activity: Not on file   Stress: Not on file   Social Connections: Not on file   Intimate Partner Violence: Not on file   Housing Stability: Not on file      Medications and Allergies:     Current Outpatient Medications   Medication Sig Dispense Refill   • Arginine 1000 MG TABS Take 1,000 mg by mouth in the morning     • Ascorbic Acid (Vitamin C) 500 MG CHEW      • aspirin (ECOTRIN LOW STRENGTH) 81 mg EC tablet Take by mouth     • diltiazem (TIAZAC) 300 MG 24 hr capsule TAKE 1 CAPSULE DAILY 90 capsule 0   • glucosamine-chondroitin 500-400 MG tablet Take 1 tablet by mouth in the morning      • irbesartan-hydrochlorothiazide (AVALIDE) 300-12.5 MG per tablet Take 1 tablet by mouth daily 90 tablet 3   • Omega-3 Fatty Acids (fish oil) 1,000 mg      • rosuvastatin (CRESTOR) 5 mg tablet Take 1 tablet (5 mg total) by mouth daily 90 tablet 3     No current facility-administered medications for this visit. No Known Allergies   Immunizations:     Immunization History   Administered Date(s) Administered   • INFLUENZA 12/19/2013, 10/31/2014, 01/06/2017, 11/24/2018, 12/12/2022   • Influenza Quadrivalent Preservative Free 3 years and older IM 12/31/2015, 02/06/2020, 11/10/2020, 11/04/2021   • Influenza, high dose seasonal 0.7 mL 12/12/2022      Health Maintenance:         Topic Date Due   • Colorectal Cancer Screening  12/09/2032   • Hepatitis C Screening  Completed         Topic Date Due   • COVID-19 Vaccine (1) Never done   • Pneumococcal Vaccine: 65+ Years (1 - PCV) Never done   • Influenza Vaccine (1) 09/01/2023      Medicare Screening Tests and Risk Assessments:     Last Medicare Wellness visit information reviewed, patient interviewed and updates made to the record today.       Health Risk Assessment:   Patient rates overall health as good. Patient feels that their physical health rating is slightly better. Patient is very satisfied with their life. Eyesight was rated as same. Hearing was rated as same. Patient feels that their emotional and mental health rating is same. Patients states they are never, rarely angry. Patient states they are never, rarely unusually tired/fatigued. Pain experienced in the last 7 days has been some. Patient's pain rating has been 2/10. Patient states that he has experienced no weight loss or gain in last 6 months. Some joint pain in left pinky. Apply topical pain killer before bed to control. Has some hand arthritis. Fall Risk Screening: In the past year, patient has experienced: no history of falling in past year      Home Safety:  Patient does not have trouble with stairs inside or outside of their home. Patient has working smoke alarms and has working carbon monoxide detector. Home safety hazards include: none. Nutrition:   Current diet is Regular. Medications:   Patient is currently taking over-the-counter supplements. OTC medications include: Vitamin C, Glucosamin/Chondroitin, Fish Oil, L-Arginine, Ocuvit. Patient is able to manage medications. Activities of Daily Living (ADLs)/Instrumental Activities of Daily Living (IADLs):   Walk and transfer into and out of bed and chair?: Yes  Dress and groom yourself?: Yes    Bathe or shower yourself?: Yes    Feed yourself?  Yes  Do your laundry/housekeeping?: Yes  Manage your money, pay your bills and track your expenses?: Yes  Make your own meals?: Yes    Do your own shopping?: Yes    Previous Hospitalizations:   Any hospitalizations or ED visits within the last 12 months?: No      Advance Care Planning:   Living will: No    Durable POA for healthcare: No    Advanced directive: No    Advanced directive counseling given: Yes    End of Life Decisions reviewed with patient: Yes    Provider agrees with end of life decisions: Yes      Comments: He isn't ready to decided. I urged him to discuss this with his wife and complete a living will. Cognitive Screening:   Provider or family/friend/caregiver concerned regarding cognition?: No    PREVENTIVE SCREENINGS      Cardiovascular Screening:    General: Screening Not Indicated and History Lipid Disorder      Diabetes Screening:     General: Screening Current      Colorectal Cancer Screening:     General: Screening Current      Osteoporosis Screening:    General: Screening Not Indicated      Abdominal Aortic Aneurysm (AAA) Screening:    Risk factors include: age between 70-75 yo        Lung Cancer Screening:     General: Screening Not Indicated      Hepatitis C Screening:    General: Screening Current    Screening, Brief Intervention, and Referral to Treatment (SBIRT)    Screening  Typical number of drinks in a day: 0  Typical number of drinks in a week: 0  Interpretation: Low risk drinking behavior. AUDIT-C Screenin) How often did you have a drink containing alcohol in the past year? monthly or less  2) How many drinks did you have on a typical day when you were drinking in the past year? 0  3) How often did you have 6 or more drinks on one occasion in the past year? never    AUDIT-C Score: 1  Interpretation: Score 0-3 (male): Negative screen for alcohol misuse    Single Item Drug Screening:  How often have you used an illegal drug (including marijuana) or a prescription medication for non-medical reasons in the past year? never    Single Item Drug Screen Score: 0  Interpretation: Negative screen for possible drug use disorder    No results found. Physical Exam:     /74   Pulse 68   Ht 6' (1.829 m)   Wt 105 kg (231 lb)   SpO2 97%   BMI 31.33 kg/m²     Wt Readings from Last 3 Encounters:   23 105 kg (231 lb)   23 107 kg (235 lb)   23 106 kg (234 lb 6.4 oz)        Physical Exam  Constitutional:       General: He is not in acute distress. Appearance: Normal appearance.  He is well-developed. He is not ill-appearing. Neck:      Vascular: No JVD. Cardiovascular:      Rate and Rhythm: Normal rate and regular rhythm. Heart sounds: Murmur ( 3/6 harsh, crescendo-decrescendo murmur audible throughout the precordium and radiating into the carotids.) heard. No friction rub. No gallop. Pulmonary:      Breath sounds: Normal breath sounds. Abdominal:      General: Bowel sounds are normal. There is no distension. Palpations: Abdomen is soft. There is no mass. Musculoskeletal:         General: No swelling. Neurological:      Mental Status: He is alert.           Julio Glass MD

## 2023-07-18 NOTE — ASSESSMENT & PLAN NOTE
His blood pressure is not well controlled despite being switch over to the irbesartan-hydrochlorothiazide. I would favor switching out diltiazem for amlodipine but will check with his cardiologist, Dr. Juliana Pak. I will let him know after we have that discussion.

## 2023-07-18 NOTE — Clinical Note
Hi there. I saw Travis Martin today and his blood pressure is still elevated. He has been on the irbesartan-hydrochlorothiazide for over a month. I was wondering if we could switch him off of diltiazem in favor of amlodipine?

## 2023-07-20 ENCOUNTER — PATIENT MESSAGE (OUTPATIENT)
Dept: FAMILY MEDICINE CLINIC | Facility: CLINIC | Age: 68
End: 2023-07-20

## 2023-07-20 DIAGNOSIS — I10 ESSENTIAL HYPERTENSION: Primary | ICD-10-CM

## 2023-07-21 RX ORDER — AMLODIPINE BESYLATE 2.5 MG/1
2.5 TABLET ORAL DAILY
Qty: 90 TABLET | Refills: 3 | Status: SHIPPED | OUTPATIENT
Start: 2023-07-21 | End: 2023-07-25 | Stop reason: SDUPTHER

## 2023-07-25 ENCOUNTER — TELEPHONE (OUTPATIENT)
Dept: FAMILY MEDICINE CLINIC | Facility: CLINIC | Age: 68
End: 2023-07-25

## 2023-07-25 DIAGNOSIS — I10 ESSENTIAL HYPERTENSION: ICD-10-CM

## 2023-07-25 RX ORDER — AMLODIPINE BESYLATE 2.5 MG/1
2.5 TABLET ORAL DAILY
Qty: 90 TABLET | Refills: 3 | Status: SHIPPED | OUTPATIENT
Start: 2023-07-25 | End: 2023-07-28 | Stop reason: SDUPTHER

## 2023-07-25 NOTE — TELEPHONE ENCOUNTER
Patient called in regards to his Amlodipine it was sent into [facility] in [place] on 7/21.  He would like that one cancelled and prescription sent into [facility] [address]

## 2023-07-28 DIAGNOSIS — I10 ESSENTIAL HYPERTENSION: ICD-10-CM

## 2023-07-28 RX ORDER — AMLODIPINE BESYLATE 2.5 MG/1
2.5 TABLET ORAL DAILY
Qty: 90 TABLET | Refills: 3 | Status: SHIPPED | OUTPATIENT
Start: 2023-07-28

## 2023-10-19 DIAGNOSIS — E78.2 MIXED HYPERLIPIDEMIA: ICD-10-CM

## 2023-10-20 RX ORDER — ROSUVASTATIN CALCIUM 5 MG/1
5 TABLET, COATED ORAL DAILY
Qty: 90 TABLET | Refills: 3 | Status: SHIPPED | OUTPATIENT
Start: 2023-10-20

## 2023-11-03 ENCOUNTER — HOSPITAL ENCOUNTER (OUTPATIENT)
Dept: NON INVASIVE DIAGNOSTICS | Facility: HOSPITAL | Age: 68
Discharge: HOME | End: 2023-11-03
Attending: INTERNAL MEDICINE
Payer: COMMERCIAL

## 2023-11-03 VITALS
SYSTOLIC BLOOD PRESSURE: 156 MMHG | WEIGHT: 231.48 LBS | HEIGHT: 72 IN | HEART RATE: 78 BPM | DIASTOLIC BLOOD PRESSURE: 74 MMHG | BODY MASS INDEX: 31.35 KG/M2

## 2023-11-03 DIAGNOSIS — Q23.1 BICUSPID AORTIC VALVE: ICD-10-CM

## 2023-11-03 LAB
AORTIC ROOT: 4 CM
AORTIC VALVE MEAN VELOCITY: 20.6 M/S
APICAL FOUR CHAMBER EJECTION FRACTION: 63 %
ASCENDING AORTA: 3.4 CM
AV AREA BY CONTINUOUS VTI: 2.1 CM2
AV AREA PEAK VELOCITY: 1.6 CM2
AV LVOT MEAN GRADIENT: 4 MMHG
AV LVOT PEAK GRADIENT: 6 MMHG
AV MEAN GRADIENT: 20 MMHG
AV PEAK GRADIENT: 39 MMHG
AV REGURGITATION PRESSURE HALF TIME: 775 MS
AV VALVE AREA: 2.05 CM2
AV VELOCITY RATIO: 0.39
DOP CALC AO PEAK VEL: 3.13 M/S
DOP CALC AO VTI: 66.74 CM
DOP CALC LVOT AREA: 4.15 CM2
DOP CALC LVOT CARDIAC INDEX: 3.7 L/MIN/M2
DOP CALC LVOT CARDIAC OUTPUT: 8.39 L/MIN
DOP CALC LVOT DIAMETER: 2.3 CM
DOP CALC LVOT PEAK VEL VTI: 32.96 CM
DOP CALC LVOT PEAK VEL: 1.23 M/S
DOP CALC LVOT STROKE INDEX: 57.7 ML/M2
DOP CALC LVOT STROKE VOLUME: 136.87
E WAVE DECELERATION TIME: 213 MS
E/A RATIO: 0.82
FRACTIONAL SHORTENING: 18 (ref 28–44)
INTERVENTRICULAR SEPTUM IN DIASTOLE (PARASTERNAL SHORT AXIS VIEW): 2 CM
INTERVENTRICULAR SEPTUM: 2 CM (ref 0.6–1.1)
LAAS-AP2: 23 CM2
LAAS-AP4: 22 CM2
LEFT ATRIUM SIZE: 3.8 CM
LEFT ATRIUM VOLUME (MOD BIPLANE): 89 ML
LEFT ATRIUM VOLUME INDEX (MOD BIPLANE): 39.2 ML/M2
LEFT INTERNAL DIMENSION IN SYSTOLE: 2.7 CM (ref 2.1–4)
LEFT VENTRICLE DIASTOLIC VOLUME (MOD BIPLANE): 147 ML
LEFT VENTRICLE SYSTOLIC VOLUME (MOD BIPLANE): 49 ML
LEFT VENTRICULAR INTERNAL DIMENSION IN DIASTOLE: 3.3 CM (ref 3.5–6)
LEFT VENTRICULAR POSTERIOR WALL IN END DIASTOLE: 1.5 CM
LEFT VENTRICULAR STROKE VOLUME: 16 ML
LV EF: 66 %
LVSV (TEICH): 16 ML
MV E'TISSUE VEL-LAT: 7 CM/S
MV E'TISSUE VEL-SEP: 6 CM/S
MV PEAK A VEL: 0.76 M/S
MV PEAK E VEL: 62 CM/S
MV STENOSIS PRESSURE HALF TIME: 62 MS
MV VALVE AREA P 1/2 METHOD: 3.55
PV PEAK GRADIENT: 3 MMHG
RA PRESSURE ESTIMATED: 3 MMHG
RIGHT ATRIUM AREA SYSTOLE A4C: 13.9 CM2
RIGHT VENTRICLE ID DIMENSION: 3.8 CM
RV PSP: 29 MMHG
SINOTUBULAR JUNCTION: 3.1 CM
SL CV AV DECELERATION TIME RETROGRADE: 2673 MS
SL CV AV PEAK GRADIENT RETROGRADE: 45 MMHG
SL CV LEFT ATRIUM LENGTH A2C: 5.9 CM
SL CV LV EF: 66
SL CV PED ECHO LEFT VENTRICLE DIASTOLIC VOLUME (MOD BIPLANE) 2D: 43 ML
SL CV PED ECHO LEFT VENTRICLE SYSTOLIC VOLUME (MOD BIPLANE) 2D: 28 ML
SL CV SINUS OF VALSALVA 2D: 3.6 CM
STJ: 3.1 CM
TR MAX PG: 26 MMHG
TR PEAK VELOCITY: 2.6 M/S
TRICUSPID ANNULAR PLANE SYSTOLIC EXCURSION: 1.9 CM
TRICUSPID VALVE PEAK REGURGITATION VELOCITY: 2.56 M/S

## 2023-11-03 PROCEDURE — 93306 TTE W/DOPPLER COMPLETE: CPT

## 2023-11-14 ENCOUNTER — TELEPHONE (OUTPATIENT)
Dept: CARDIOLOGY CLINIC | Facility: CLINIC | Age: 68
End: 2023-11-14

## 2023-11-14 NOTE — TELEPHONE ENCOUNTER
----- Message from [address] Faisal Torres sent at 11/13/2023  5:53 PM EST -----  Please let Shaggy Neil know that his echo showed normal heart function with moderate aortic stenosis. Dr. Merlin Pack will review in detail at his appt later this month. Thank you!

## 2023-11-28 ENCOUNTER — OFFICE VISIT (OUTPATIENT)
Dept: CARDIOLOGY CLINIC | Facility: CLINIC | Age: 68
End: 2023-11-28
Payer: COMMERCIAL

## 2023-11-28 VITALS
HEART RATE: 67 BPM | BODY MASS INDEX: 31.74 KG/M2 | DIASTOLIC BLOOD PRESSURE: 80 MMHG | OXYGEN SATURATION: 96 % | TEMPERATURE: 98.3 F | WEIGHT: 234 LBS | SYSTOLIC BLOOD PRESSURE: 160 MMHG

## 2023-11-28 DIAGNOSIS — Q23.1 BICUSPID AORTIC VALVE: Primary | ICD-10-CM

## 2023-11-28 DIAGNOSIS — I35.1 MILD AORTIC VALVE REGURGITATION: ICD-10-CM

## 2023-11-28 DIAGNOSIS — I10 ESSENTIAL HYPERTENSION: ICD-10-CM

## 2023-11-28 DIAGNOSIS — I35.0 MODERATE AORTIC VALVE STENOSIS: ICD-10-CM

## 2023-11-28 DIAGNOSIS — I77.819 AORTIC ECTASIA (HCC): ICD-10-CM

## 2023-11-28 DIAGNOSIS — E78.2 MIXED HYPERLIPIDEMIA: ICD-10-CM

## 2023-11-28 PROCEDURE — 99214 OFFICE O/P EST MOD 30 MIN: CPT | Performed by: INTERNAL MEDICINE

## 2023-11-28 PROCEDURE — 93000 ELECTROCARDIOGRAM COMPLETE: CPT | Performed by: INTERNAL MEDICINE

## 2023-11-28 RX ORDER — AMLODIPINE BESYLATE 10 MG/1
10 TABLET ORAL DAILY
Qty: 90 TABLET | Refills: 3 | Status: SHIPPED | OUTPATIENT
Start: 2023-11-28

## 2023-11-28 NOTE — PROGRESS NOTES
[address], [address], [address] [place] [address]  Phone#  [phone] Fax#  [phone]  *-*-*-*-*-*-*-*-*-*-*-*-*-*-*-*-*-*-*-*-*-*-*-*-*-*-*-*-*-*-*-*-*-*-*-*-*-*-*-*-*-*-*-*-*-*-*-*-*-*-*-*-*-*  ENCOUNTER DATE: 11/28/23 5:10 PM  PATIENT NAME: Jaxon Sesay   1955    [id number]  AGE:68 y.o. SEX: male  [address] [place] [address], MD     PRIMARY CARE PHYSICIAN: Prashanth Galan MD    DIAGNOSES:  1. Bicuspid aortic valve-with moderate aortic valve stenosis and mild aortic valve regurgitation and dilated aortic root (Echo November 2023). 2.  Bilateral carotid artery stenosis  3. Primary hypertension  4. 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 Dyslipidemia  6. Obesity  7. Melanoma in situ  8. Hypokalemia    ECHOCARDIOGRAM 11/3/2023: Moderate concentric left ventricular hypertrophy, normal left ventricular systolic function, grade 1 diastolic dysfunction. Normal right ventricle size and systolic function. Mild biatrial enlargement  Moderately calcified aortic valve. Aortic valve leaflets could not be identified although history mention bicuspid aortic valve. There was mild aortic valve regurgitation and moderate aortic valve stenosis. Peak transaortic velocity was 3.13 m/s, mean gradient was 39 mmHg. Mildly thickened mitral valve with mild mitral valve regurgitation. Mild tricuspid valve regurgitation. Mildly dilated aortic root with normal ascending aorta size. No obvious pulmonary hypertension. No pericardial effusion. ECHOCARDIOGRAM 11/23/2022:    Left Ventricle: Moderate concentric left ventricular hypertrophy with prominence of the basal interventricular septum without outflow tract gradient identified. Hyperdynamic LV systolic function with estimated ejection fraction 70%. No regional wall motion abnormality identified.   Normal end-diastolic pressures estimated with mild diastolic relaxation abnormality  ·  Right Ventricle: Normal right ventricular size and function. ·  Aortic Valve: Three distinct aortic leaflets are not well seen. There is particular thickening of the non coronary cusp. Historically valve has been described as bicuspid. Leaflet excursion appears reduced. There is a measured transaortic velocity of 3.6 m/sec corresponding to peak mean gradients of 52 in 29 mmHg respectively. Suspect moderate aortic stenosis. There is mild central insufficiency. ·  Mitral Valve: Mitral annular calcification with mild mitral leaflet thickening, preserved leaflet excursion and mild insufficiency. ·  Tricuspid Valve: Normal-appearing tricuspid leaflets with preserved excursion and mild regurgitation. Measured tricuspid regurgitant velocity of 2.9 m/sec corresponds a gradient of 33 mmHg. With addition of 7 mm Hg for estimated right atrial pressure, estimated pulmonary pressure is moderately increased 40 mmHg. ·  Aorta: The aortic root is mildly dilated. The ascending aorta is mildly dilated. Echocardiogram 11/29/2019: Ejection fraction of 66% with normal diastolic parameters for age, bicuspid aortic valve with mild stenosis and mild regurgitation along with mild mitral regurgitation and trace tricuspid regurgitation and normal pulmonary pressure estimate, unchanged. CAROTID ULTRASOUND 11/25/2022:  RIGHT:  There is <50% stenosis noted in the internal carotid artery. Plaque is  heterogenous and irregular. Vertebral artery flow is antegrade. There is no significant subclavian artery  disease. LEFT:  There is <50% stenosis noted in the internal carotid artery. Plaque is  homogenous and smooth. Vertebral artery flow is antegrade. There is no significant subclavian artery  disease. CAROTID DUPLEX:11/29/2019: 16-49% stenosis of the right internal carotid artery with lower range of 16-49% stenosis of the left internal carotid artery, unchanged.      CURRENT ECG     Results for orders placed or performed in visit on 11/28/23   POCT ECG    Narrative    Sinus rhythm, HR 64 bpm, normal axis and intervals, no significant chamber hypertrophy enlargement, nonspecific ST-T wave abnormalities in lateral leads. No evidence of prior infarction. CARDIOLOGY ASSESSMENT & PLAN     1. Bicuspid aortic valve  POCT ECG    CT chest wo contrast    Basic metabolic panel    TSH, 3rd generation with Free T4 reflex      2. Mixed hyperlipidemia        3. Essential hypertension  amLODIPine (NORVASC) 10 mg tablet    Basic metabolic panel    TSH, 3rd generation with Free T4 reflex      4. Aortic ectasia (HCC)  CT chest wo contrast      5. Mild aortic valve regurgitation        6. Moderate aortic valve stenosis          Bicuspid aortic valve              Mr. Domingo Felix is noted to have bicuspid aortic valve with associated moderate aortic valve stenosis and trace to mild aortic valve regurgitation. In addition he has mildly dilated aortic root and normal diameter of proximal ascending aorta. He has had no significant symptoms relating to his aortic valve disease and his exercise tolerance has been good. His recent echocardiogram did identify moderate left ventricular hypertrophy preserved left ventricular systolic function and grade 1 diastolic dysfunction. He has no pulmonary hypertension. He gives no personal or family history history that suggests underlying congenital diseases that are associated with bicuspid aortic valve including no history of Marfan syndrome, history of Elmo-Danlos syndrome, Loeys-Nakita, or DiGeorge syndrome or Jeronimo syndrome. There is no family history of ventricular septal defect or aortic valve or disease of the aorta in other family members. He has no family history of vasculitis or syphilis. He does have uncontrolled hypertension. He denies symptoms that suggest underlying sleep apnea. Physical examination is significant for increased BMI and modest aortic valve stenosis murmur.   There is no evidence of pulmonary or peripheral vascular congestion. The Marked murmur of aortic valve stenosis is still early peaking suggesting no severe aortic valve stenosis. Today we had detailed discussion about the condition of bicuspid aortic valve and its associated conditions and diseases and the need for aggressive blood pressure control and risk factor modification. We also talked about the importance of taking precautions and serial monitoring. We discussed the warning signs of clinically significant aortic valve disease. We also talked about need for testing in family members and about precautions to take in the future. We are doing the following:    --  we are increasing the dose of amlodipine to 10 mg daily. -- He is advised to continue all other medications. -- Is being provided with a blood pressure tracking sheet with instructions to record random blood pressures. Our goal is to bring his systolic blood pressures consistently less than 370 mmHg and diastolic blood pressures less than 80 mmHg. We will arrange a follow-up visit in 2 months to assess his blood pressure controlled. -- I am referring him for CT scan of the chest to screen for aortic aneurysm. -- Given his history I am recommending that all his first-degree relatives that his siblings and children should undergo comprehensive physical examination and a screening thoracic echo echocardiogram to evaluate for bicuspid aortic valve. -- I am advising him additional nonpharmacologic measures to improve blood pressures. Some tips are provided below. -- Regarding exercise activity based on his current diameter of the aorta is no contraindication to exercise as tolerated. However he should avoid intense weight training.  -- We will get  follow-up limited echocardiogram in 6 months to reassess for progression of his aortic valve disease.               INTERVAL HISTORY, HISTORY OF PRESENT ILLNESS & COMPREHENSIVE VISIT INFORMATION     Ghazal Zuñiga is here for follow-up regarding his cardiac comorbidities which include: History of bicuspid aortic valve, primary hypertension, carotid artery disease and other comorbidities. He was last seen by Dr. Ariel Rea in May 2023. He is here for follow-up today. He mentions that since his last visit with Dr. Ariel Rea his antihypertensive medications have been changed and amlodipine was added. He was previously on lisinopril HCTZ and this has been changed to irbesartan HCTZ. From a symptom perspective he mentions that in past August he was vacationing in [place] and had to walk back and forth over the [employer] and he noticed that he was getting short of breath with activity and needed to take breaks. Other than that he has had no recent unusual chest pain or shortness of breath with normal activities or palpitations or passing out or near passing out episodes or swelling in the feet. He does monitor his blood pressures from time to time and still there elevated despite the medication changes. He denies symptoms of sleep apnea including excessive snoring having apneic spells or having daytime fatigue or increased somnolence or headaches. He reports being fairly active. He goes to gym regularly and exercises including exercising on treadmill and doing weights. Denies orthopnea PND or pedal edema. Functional capacity status: Good   (Excellent- >10 METs; Good: (7-10 METs); Moderate (4-7 METs); Poor (<= 4 METs)    Any chronic stressors: None   (feeling of poor health, financial problems, and social isolation etc). Tobacco or alcohol dependence: He  has never been a smoker. He denies any significant alcohol use. He has about 2 months of coffee in the morning. His family history significant for his mother having hypertension and unspecified angina. There is no family history of premature coronary artery disease or sudden cardiac death. He retired from working or [employer].     Current cardiac medications: Rosuvastatin 5 mg daily amlodipine 2.5 mg daily daily irbesartan/HCTZ 300-12.5 mg daily omega-3 fatty acids aspirin 81 mg daily    Last recent comprehensive blood work available:   Blood work 4/27/2023 sodium 141 potassium 4.2 chloride 103 bicarb 33 BUN 18 creatinine 1.15 GFR 65  Lipid profile 12/8/2022 total cholesterol 144 triglyceride 85 HDL 53 calculated LDL 74  Hemoglobin A1c 5.7 in 2021  No recent TSH available    The 10-year ASCVD risk score (Lance WILLIAMSON, et al., 2019) is: 19.6%    Values used to calculate the score:      Age: 80 years      Sex: Male      Is Non- : No      Diabetic: No      Tobacco smoker: No      Systolic Blood Pressure: 842 mmHg      Is BP treated: Yes      HDL Cholesterol: 53 mg/dL      Total Cholesterol: 144 mg/dL  (Low risk: Less than <5%; borderline risk: ?5% to <7.5%; intermediate risk: ?7.5% to <20%; high risk:?20%)  Patient's ASCVD risk category: Intermediate risk    REVIEW OF SYSTEMS   Positive for: As noted above in HPI  Negative for: All remaining as reviewed below and in HPI.     SYSTEM SYMPTOMS REVIEWED:  General--weight change, fever, night sweats  Respiratory--cough, wheezing, shortness of breath, sputum production  Cardiovascular--chest pain, syncope, dyspnea on exertion, edema, decline in exercise tolerance, claudication   Gastrointestinal--persistent vomiting, diarrhea, abdominal distention, blood in stool   Muscular or skeletal--joint pain or swelling   Neurologic--headaches, syncope, abnormal movement  Hematologic--history of easy bruising and bleeding   Endocrine--thyroid enlargement, heat or cold intolerance, polyuria   Psychiatric--anxiety, depression     *-*-*-*-*-*-*-*-*-*-*-*-*-*-*-*-*-*-*-*-*-*-*-*-*-*-*-*-*-*-*-*-*-*-*-*-*-*-*-*-*-*-*-*-*-*-*-*-*-*-*-*-*-*-  VITAL SIGNS     CURRENT VITAL SIGNS:   Vitals:    11/28/23 1308 11/28/23 1350   BP: (!) 182/88 160/80   BP Location: Left arm Left arm   Patient Position: Sitting Sitting   Cuff Size: Large Extra-Large   Pulse: 67    Temp: 98.3 °F (36.8 °C)    SpO2: 96%    Weight: 106 kg (234 lb)        BMI: Body mass index is 31.74 kg/m². WEIGHTS:   Wt Readings from Last 25 Encounters:   11/28/23 106 kg (234 lb)   11/03/23 105 kg (231 lb 7.7 oz)   07/18/23 105 kg (231 lb)   05/17/23 107 kg (235 lb)   05/02/23 106 kg (234 lb 6.4 oz)   01/12/23 105 kg (231 lb 3.2 oz)   12/27/22 108 kg (237 lb)   12/12/22 107 kg (236 lb 3.2 oz)   11/23/22 107 kg (236 lb)   09/23/22 107 kg (236 lb)   06/13/22 109 kg (240 lb 9.6 oz)   05/23/22 110 kg (242 lb 3.2 oz)      *-*-*-*-*-*-*-*-*-*-*-*-*-*-*-*-*-*-*-*-*-*-*-*-*-*-*-*-*-*-*-*-*-*-*-*-*-*-*-*-*-*-*-*-*-*-*-*-*-*-*-*-*-*-  PHYSICAL EXAM     General Appearance:    Alert, cooperative, no distress, appears stated age, large build, slightly obese   Head, Eyes, ENT:    No obvious abnormality, moist mucous mebranes. Neck:   Supple, radiation of aortic valve stenosis murmur noted in bilateral carotids   Back:     Symmetric, no curvature. Lungs:     Respirations unlabored. Clear to auscultation bilaterally,    Chest wall:    No tenderness or deformity   Heart:    Regular rate and rhythm, grade 3/6 harsh systolic murmur most noticeable on the left upper sternal border radiating towards the apex and into the neck. Abdomen:     Soft, non-tender,    Extremities:   Extremities warm, no cyanosis or edema    Skin: No venostatic changes in lower extremities. Normal skin color, texture, and turgor.  No rashes or lesions     *-*-*-*-*-*-*-*-*-*-*-*-*-*-*-*-*-*-*-*-*-*-*-*-*-*-*-*-*-*-*-*-*-*-*-*-*-*-*-*-*-*-*-*-*-*-*-*-*-*-*-*-*-*-        CURRENT MEDICATIONS [id number]     Current Outpatient Medications:     amLODIPine (NORVASC) 10 mg tablet, Take 1 tablet (10 mg total) by mouth daily, Disp: 90 tablet, Rfl: 3    Arginine 1000 MG TABS, Take 1,000 mg by mouth in the morning, Disp: , Rfl:     Ascorbic Acid (Vitamin C) 500 MG CHEW, , Disp: , Rfl:     aspirin (ECOTRIN LOW STRENGTH) 81 mg EC tablet, Take by mouth, Disp: , Rfl:     glucosamine-chondroitin 500-400 MG tablet, Take 1 tablet by mouth in the morning , Disp: , Rfl:     irbesartan-hydrochlorothiazide (AVALIDE) 300-12.5 MG per tablet, Take 1 tablet by mouth daily, Disp: 90 tablet, Rfl: 3    Omega-3 Fatty Acids (fish oil) 1,000 mg, , Disp: , Rfl:     rosuvastatin (CRESTOR) 5 mg tablet, TAKE 1 TABLET DAILY, Disp: 90 tablet, Rfl: 3       ALLERGIES   No Known Allergies    *-*-*-*-*-*-*-*-*-*-*-*-*-*-*-*-*-*-*-*-*-*-*-*-*-*-*-*-*-*-*-*-*-*-*-*-*-*-*-*-*-*-*-*-*-*-*-*-*-*-*-*-*-*-  LABORATORY DATA   No results found for: "NA"  Potassium   Date Value Ref Range Status   04/27/2023 4.2 3.5 - 5.3 mmol/L Final   12/08/2022 3.3 (L) 3.5 - 5.3 mmol/L Final   06/06/2022 4.1 3.5 - 5.3 mmol/L Final     Chloride   Date Value Ref Range Status   04/27/2023 103 96 - 108 mmol/L Final   12/08/2022 101 96 - 108 mmol/L Final   06/06/2022 103 100 - 108 mmol/L Final     CO2   Date Value Ref Range Status   04/27/2023 33 (H) 21 - 32 mmol/L Final   12/08/2022 34 (H) 21 - 32 mmol/L Final   06/06/2022 32 21 - 32 mmol/L Final     BUN   Date Value Ref Range Status   04/27/2023 18 5 - 25 mg/dL Final   12/08/2022 15 5 - 25 mg/dL Final   06/06/2022 15 5 - 25 mg/dL Final     Creatinine   Date Value Ref Range Status   04/27/2023 1.15 0.60 - 1.30 mg/dL Final     Comment:     Standardized to IDMS reference method   12/08/2022 1.02 0.60 - 1.30 mg/dL Final     Comment:     Standardized to IDMS reference method   06/06/2022 1.10 0.60 - 1.30 mg/dL Final     Comment:     Standardized to IDMS reference method     eGFR   Date Value Ref Range Status   04/27/2023 65 ml/min/1.73sq m Final   12/08/2022 76 ml/min/1.73sq m Final   06/06/2022 69 ml/min/1.73sq m Final     Calcium   Date Value Ref Range Status   04/27/2023 9.2 8.4 - 10.2 mg/dL Final   12/08/2022 9.4 8.3 - 10.1 mg/dL Final   06/06/2022 9.5 8.3 - 10.1 mg/dL Final     No results found for: "WBC", "HGB", "PLT"  No results found for: "PT", "PTT", "INR"  No results found for: "CKMB", "DIGOXIN"  No results found for: "TSH"  No results found for: "CHOL"   Hemoglobin A1C   Date Value Ref Range Status   03/04/2021 5.7 (H) <5.7 % Final     Comment:     Reference Range  Non-diabetic                     <5.7  Pre-diabetic                     5.7-6.4  Diabetic                         >=6.5  ADA target for diabetic control  <=7     No results found for: "BLOODCX", "SPUTUMCULTUR", "Susan Old", "Venora Ponto", "WOUNDCULT", "BODYFLUIDCUL", "[place]", "INFLUAPCR", "INFLUBPCR", "RSVPCR", "Izabel Horseman", "CDIFFTOXINB"   *-*-*-*-*-*-*-*-*-*-*-*-*-*-*-*-*-*-*-*-*-*-*-*-*-*-*-*-*-*-*-*-*-*-*-*-*-*-*-*-*-*-*-*-*-*-*-*-*-*-*-*-*-*-  SIGNATURES:   @SIG@   Eva Lam MD; JAS    *-*-*-*-*-*-*-*-*-*-*-*-*-*-*-*-*-*-*-*-*-*-*-*-*-*-*-*-*-*-*-*-*-*-*-*-*-*-*-*-*-*-*-*-*-*-*-*-*-*-*-*-*-*-  PAST MEDICAL HISTORY:  Past Medical History:   Diagnosis Date    Bicuspid aortic valve     Carotid stenosis     Hypertension     Uncomplicated. Well-controlled overall.     PAST SURGICAL HISTORY:  Past Surgical History:   Procedure Laterality Date    VASECTOMY           FAMILY HISTORY:  Family History   Problem Relation Age of Onset    Hypertension Mother     Arthritis Mother     Stroke Mother         Age 80    Completed Suicide  Father     No Known Problems Sister     Hypertension Brother     Hypertension Brother     Prostate cancer Paternal Uncle     SOCIAL HISTORY:  Social History     Tobacco Use   Smoking Status Never   Smokeless Tobacco Never      Social History     Substance and Sexual Activity   Alcohol Use Not Currently     Social History     Substance and Sexual Activity   Drug Use Never    [id number]   *-*-*-*-*-*-*-*-*-*-*-*-*-*-*-*-*-*-*-*-*-*-*-*-*-*-*-*-*-*-*-*-*-*-*-*-*-*-*-*-*-*-*-*-*-*-*-*-*-*-*-*-*-*

## 2023-11-28 NOTE — ASSESSMENT & PLAN NOTE
Mr. Fadia Pineda is noted to have bicuspid aortic valve with associated moderate aortic valve stenosis and trace to mild aortic valve regurgitation. In addition he has mildly dilated aortic root and normal diameter of proximal ascending aorta. He has had no significant symptoms relating to his aortic valve disease and his exercise tolerance has been good. His recent echocardiogram did identify moderate left ventricular hypertrophy preserved left ventricular systolic function and grade 1 diastolic dysfunction. He has no pulmonary hypertension. He gives no personal or family history history that suggests underlying congenital diseases that are associated with bicuspid aortic valve including no history of Marfan syndrome, history of Elmo-Danlos syndrome, Loeys-Nakita, or DiGeorge syndrome or Jeronimo syndrome. There is no family history of ventricular septal defect or aortic valve or disease of the aorta in other family members. He has no family history of vasculitis or syphilis. He does have uncontrolled hypertension. He denies symptoms that suggest underlying sleep apnea. Physical examination is significant for increased BMI and modest aortic valve stenosis murmur. There is no evidence of pulmonary or peripheral vascular congestion. The Marked murmur of aortic valve stenosis is still early peaking suggesting no severe aortic valve stenosis. Today we had detailed discussion about the condition of bicuspid aortic valve and its associated conditions and diseases and the need for aggressive blood pressure control and risk factor modification. We also talked about the importance of taking precautions and serial monitoring. We discussed the warning signs of clinically significant aortic valve disease. We also talked about need for testing in family members and about precautions to take in the future.   We are doing the following:    --  we are increasing the dose of amlodipine to 10 mg daily.  -- He is advised to continue all other medications. -- Is being provided with a blood pressure tracking sheet with instructions to record random blood pressures. Our goal is to bring his systolic blood pressures consistently less than 383 mmHg and diastolic blood pressures less than 80 mmHg. We will arrange a follow-up visit in 2 months to assess his blood pressure controlled. -- I am referring him for CT scan of the chest to screen for aortic aneurysm. -- Given his history I am recommending that all his first-degree relatives that his siblings and children should undergo comprehensive physical examination and a screening thoracic echo echocardiogram to evaluate for bicuspid aortic valve. -- I am advising him additional nonpharmacologic measures to improve blood pressures. Some tips are provided below. -- Regarding exercise activity based on his current diameter of the aorta is no contraindication to exercise as tolerated. However he should avoid intense weight training.  -- We will get  follow-up limited echocardiogram in 6 months to reassess for progression of his aortic valve disease.

## 2023-11-28 NOTE — PATIENT INSTRUCTIONS
CARDIOLOGY ASSESSMENT & PLAN     1. Bicuspid aortic valve  POCT ECG    CT chest wo contrast    Basic metabolic panel    TSH, 3rd generation with Free T4 reflex      2. Mixed hyperlipidemia        3. Essential hypertension  amLODIPine (NORVASC) 10 mg tablet    Basic metabolic panel    TSH, 3rd generation with Free T4 reflex      4. Aortic ectasia (HCC)  CT chest wo contrast      5. Mild aortic valve regurgitation        6. Moderate aortic valve stenosis          Bicuspid aortic valve              Mr. Aparna Carlson is noted to have bicuspid aortic valve with associated moderate aortic valve stenosis and trace to mild aortic valve regurgitation. In addition he has mildly dilated aortic root and normal diameter of proximal ascending aorta. He has had no significant symptoms relating to his aortic valve disease and his exercise tolerance has been good. His recent echocardiogram did identify moderate left ventricular hypertrophy preserved left ventricular systolic function and grade 1 diastolic dysfunction. He has no pulmonary hypertension. He gives no personal or family history history that suggests underlying congenital diseases that are associated with bicuspid aortic valve including no history of Marfan syndrome, history of Elmo-Danlos syndrome, Loeys-Nakita, or DiGeorge syndrome or Jeronimo syndrome. There is no family history of ventricular septal defect or aortic valve or disease of the aorta in other family members. He has no family history of vasculitis or syphilis. He does have uncontrolled hypertension. He denies symptoms that suggest underlying sleep apnea. Physical examination is significant for increased BMI and modest aortic valve stenosis murmur. There is no evidence of pulmonary or peripheral vascular congestion. The Marked murmur of aortic valve stenosis is still early peaking suggesting no severe aortic valve stenosis.     Today we had detailed discussion about the condition of bicuspid aortic valve and its associated conditions and diseases and the need for aggressive blood pressure control and risk factor modification. We also talked about the importance of taking precautions and serial monitoring. We discussed the warning signs of clinically significant aortic valve disease. We also talked about need for testing in family members and about precautions to take in the future. We are doing the following:    --  we are increasing the dose of amlodipine to 10 mg daily. -- He is advised to continue all other medications. -- Is being provided with a blood pressure tracking sheet with instructions to record random blood pressures. Our goal is to bring his systolic blood pressures consistently less than 578 mmHg and diastolic blood pressures less than 80 mmHg. We will arrange a follow-up visit in 2 months to assess his blood pressure controlled. -- I am referring him for CT scan of the chest to screen for aortic aneurysm. -- Given his history I am recommending that all his first-degree relatives that his siblings and children should undergo comprehensive physical examination and a screening thoracic echo echocardiogram to evaluate for bicuspid aortic valve. -- I am advising him additional nonpharmacologic measures to improve blood pressures. Some tips are provided below. -- Regarding exercise activity based on his current diameter of the aorta is no contraindication to exercise as tolerated. However he should avoid intense weight training.  -- We will get  follow-up limited echocardiogram in 6 months to reassess for progression of his aortic valve disease.

## 2023-12-05 ENCOUNTER — HOSPITAL ENCOUNTER (OUTPATIENT)
Dept: CT IMAGING | Facility: HOSPITAL | Age: 68
Discharge: HOME | End: 2023-12-05
Attending: INTERNAL MEDICINE
Payer: COMMERCIAL

## 2023-12-05 DIAGNOSIS — Q23.1 BICUSPID AORTIC VALVE: ICD-10-CM

## 2023-12-05 DIAGNOSIS — I77.819 AORTIC ECTASIA (HCC): ICD-10-CM

## 2023-12-05 PROCEDURE — 71250 CT THORAX DX C-: CPT

## 2023-12-05 PROCEDURE — G1004 CDSM NDSC: HCPCS

## 2024-01-16 ENCOUNTER — RA CDI HCC (OUTPATIENT)
Dept: OTHER | Facility: HOSPITAL | Age: 69
End: 2024-01-16

## 2024-01-23 ENCOUNTER — OFFICE VISIT (OUTPATIENT)
Dept: FAMILY MEDICINE CLINIC | Facility: CLINIC | Age: 69
End: 2024-01-23
Payer: COMMERCIAL

## 2024-01-23 VITALS
SYSTOLIC BLOOD PRESSURE: 134 MMHG | HEART RATE: 59 BPM | TEMPERATURE: 97.8 F | OXYGEN SATURATION: 97 % | BODY MASS INDEX: 32.32 KG/M2 | HEIGHT: 72 IN | DIASTOLIC BLOOD PRESSURE: 70 MMHG | WEIGHT: 238.6 LBS

## 2024-01-23 DIAGNOSIS — I77.819 AORTIC ECTASIA (HCC): Primary | ICD-10-CM

## 2024-01-23 DIAGNOSIS — Q23.1 BICUSPID AORTIC VALVE: ICD-10-CM

## 2024-01-23 DIAGNOSIS — E78.2 MIXED HYPERLIPIDEMIA: ICD-10-CM

## 2024-01-23 DIAGNOSIS — E66.09 CLASS 1 OBESITY DUE TO EXCESS CALORIES WITHOUT SERIOUS COMORBIDITY WITH BODY MASS INDEX (BMI) OF 32.0 TO 32.9 IN ADULT: ICD-10-CM

## 2024-01-23 DIAGNOSIS — I35.0 MODERATE AORTIC VALVE STENOSIS: ICD-10-CM

## 2024-01-23 PROCEDURE — 99214 OFFICE O/P EST MOD 30 MIN: CPT | Performed by: INTERNAL MEDICINE

## 2024-01-23 NOTE — PROGRESS NOTES
Assessment/Plan:    Problem List Items Addressed This Visit        Cardiovascular and Mediastinum    Bicuspid aortic valve    Moderate aortic valve stenosis    Aortic ectasia (HCC) - Primary     CT of the chest recently did not reveal an aortic aneurysm.            Other    Mixed hyperlipidemia    Relevant Orders    Lipid panel    Class 1 obesity due to excess calories with body mass index (BMI) of 32.0 to 32.9 in adult       Hypertension.  His blood pressure is better controlled with the addition of amlodipine 10 mg. He will continue amlodipine 10 mg. He was advised to bring his blood pressure cuff to his next visit for comparison.    Aortic valve stenosis.  His aortic valve is moderately narrowed. He has no physiologic effects that we can detect clinically other than the fact that his heart is a little bit larger because it is working a little bit harder to get that narrowed out. He was advised to be sensitive about that in his daily activities. He was advised to do some weightlifting or running on the treadmill.    Weight gain.  His weight is up a little bit. He is actually in the obesity category. He was recommended to focus on eating more fruits and vegetables and keep exercising.    Chief Complaint    Follow-up         Patient ID: Lavon Carlos is a 68 y.o. male who returns for routine follow up.    He has a new cardiologist, Dr. Lam, now that Dr. Baugh left the practice. He has a follow-up appointment with Dr. Lam in 03/2024.    He is doing good.    His medication was changed from diltiazem to amlodipine and his amlodipine dosage was increased from 2.5 mg to 10 mg.     He has been tracking his blood pressure at home with an arm cuff for Dr. Lam and it has dropped. His blood pressure readings at home are around 140/70 mmHg and he also had 121 mmHg reading at one time. He had a CT scan of his aortic valve in 11/2023.    He denies any chest pain, chest pressure, shortness of breath, or fainting  spells.    He lifts weights at the gym 3 to 4 times a week. He does interval runs 2 to 3 times a week on a treadmill.     He can lay flat in bed without any breathing problems.    He has a dermatologist visit in 03/2024 at [facility].    Objective:    /70 (BP Location: Left arm, Patient Position: Sitting, Cuff Size: Extra-Large)   Pulse 59   Temp 97.8 °F (36.6 °C)   Ht 6' (1.829 m)   Wt 108 kg (238 lb 9.6 oz)   SpO2 97%   BMI 32.36 kg/m²     Wt Readings from Last 3 Encounters:   01/23/24 108 kg (238 lb 9.6 oz)   11/28/23 106 kg (234 lb)   11/03/23 105 kg (231 lb 7.7 oz)         Physical Exam    General:  Well-developed, well-nourished, in no acute distress.  Cardiac:  There is a 5/6 systolic crescendo-decrescendo murmur loudest at the right upper sternal border but audible throughout the precordium. Regular rate and rhythm, gallop, or rub. There is no JVD or HJR.  Lungs:  Clear to auscultation and percussion.  Abdomen:  Soft, nontender, normoactive bowel sounds, no palpable masses, no hepatosplenomegaly.  Extremities:  No clubbing, cyanosis, or edema.    Echocardiogram from 11/2023 showed ejection fraction of 66% and there is moderate concentric LVH. There is evidence of aortic stenosis which is graded as moderate and there is some mild mitral regurgitation. No evidence of a thoracic aortic aneurysm, only calcification in the aortic valve.    Transcribed for Steffen Arnett MD, by Susan Vargas on 01/23/24 at 3:24 PM. Powered by Dragon [employer].

## 2024-03-13 ENCOUNTER — APPOINTMENT (OUTPATIENT)
Dept: LAB | Facility: HOSPITAL | Age: 69
End: 2024-03-13
Payer: COMMERCIAL

## 2024-03-13 DIAGNOSIS — Q23.1 BICUSPID AORTIC VALVE: ICD-10-CM

## 2024-03-13 DIAGNOSIS — I10 ESSENTIAL HYPERTENSION: ICD-10-CM

## 2024-03-13 DIAGNOSIS — Z12.5 ENCOUNTER FOR PROSTATE CANCER SCREENING: ICD-10-CM

## 2024-03-13 DIAGNOSIS — E78.2 MIXED HYPERLIPIDEMIA: ICD-10-CM

## 2024-03-13 LAB
ANION GAP SERPL CALCULATED.3IONS-SCNC: 4 MMOL/L (ref 4–13)
BUN SERPL-MCNC: 18 MG/DL (ref 5–25)
CALCIUM SERPL-MCNC: 9.3 MG/DL (ref 8.4–10.2)
CHLORIDE SERPL-SCNC: 103 MMOL/L (ref 96–108)
CHOLEST SERPL-MCNC: 145 MG/DL
CO2 SERPL-SCNC: 31 MMOL/L (ref 21–32)
CREAT SERPL-MCNC: 0.91 MG/DL (ref 0.6–1.3)
GFR SERPL CREATININE-BSD FRML MDRD: 86 ML/MIN/1.73SQ M
GLUCOSE P FAST SERPL-MCNC: 104 MG/DL (ref 65–99)
HDLC SERPL-MCNC: 43 MG/DL
LDLC SERPL CALC-MCNC: 86 MG/DL (ref 0–100)
NONHDLC SERPL-MCNC: 102 MG/DL
POTASSIUM SERPL-SCNC: 3.8 MMOL/L (ref 3.5–5.3)
PSA SERPL-MCNC: 4.85 NG/ML (ref 0–4)
SODIUM SERPL-SCNC: 138 MMOL/L (ref 135–147)
TRIGL SERPL-MCNC: 78 MG/DL
TSH SERPL DL<=0.05 MIU/L-ACNC: 0.79 UIU/ML (ref 0.45–4.5)

## 2024-03-13 PROCEDURE — 36415 COLL VENOUS BLD VENIPUNCTURE: CPT

## 2024-03-13 PROCEDURE — 84443 ASSAY THYROID STIM HORMONE: CPT

## 2024-03-13 PROCEDURE — 80061 LIPID PANEL: CPT

## 2024-03-13 PROCEDURE — 80048 BASIC METABOLIC PNL TOTAL CA: CPT

## 2024-03-13 PROCEDURE — G0103 PSA SCREENING: HCPCS

## 2024-03-18 ENCOUNTER — OFFICE VISIT (OUTPATIENT)
Dept: CARDIOLOGY CLINIC | Facility: CLINIC | Age: 69
End: 2024-03-18
Payer: COMMERCIAL

## 2024-03-18 VITALS
HEART RATE: 67 BPM | OXYGEN SATURATION: 98 % | HEIGHT: 72 IN | BODY MASS INDEX: 32.45 KG/M2 | DIASTOLIC BLOOD PRESSURE: 78 MMHG | SYSTOLIC BLOOD PRESSURE: 135 MMHG | WEIGHT: 239.6 LBS | TEMPERATURE: 96.4 F

## 2024-03-18 DIAGNOSIS — I35.0 MODERATE AORTIC VALVE STENOSIS: Primary | ICD-10-CM

## 2024-03-18 DIAGNOSIS — I10 ESSENTIAL HYPERTENSION: ICD-10-CM

## 2024-03-18 DIAGNOSIS — Q23.1 BICUSPID AORTIC VALVE: ICD-10-CM

## 2024-03-18 DIAGNOSIS — I65.23 BILATERAL CAROTID ARTERY STENOSIS: ICD-10-CM

## 2024-03-18 DIAGNOSIS — I35.1 MILD AORTIC VALVE REGURGITATION: ICD-10-CM

## 2024-03-18 PROBLEM — I77.819 AORTIC ECTASIA (HCC): Status: RESOLVED | Noted: 2024-01-23 | Resolved: 2024-03-18

## 2024-03-18 PROCEDURE — G2211 COMPLEX E/M VISIT ADD ON: HCPCS | Performed by: INTERNAL MEDICINE

## 2024-03-18 PROCEDURE — 99214 OFFICE O/P EST MOD 30 MIN: CPT | Performed by: INTERNAL MEDICINE

## 2024-03-18 RX ORDER — MV-MN/OM3/DHA/EPA/FISH/LUT/ZEA 250-5-1 MG
CAPSULE ORAL
COMMUNITY

## 2024-03-18 RX ORDER — KRILL/OM-3/DHA/EPA/PHOSPHO/AST 500MG-86MG
CAPSULE ORAL
COMMUNITY
Start: 2024-03-18

## 2024-03-18 NOTE — PROGRESS NOTES
[facility]  Medical Office Building, [address] [place] [address]  Phone#  [phone] Fax#  [phone]  *-*-*-*-*-*-*-*-*-*-*-*-*-*-*-*-*-*-*-*-*-*-*-*-*-*-*-*-*-*-*-*-*-*-*-*-*-*-*-*-*-*-*-*-*-*-*-*-*-*-*-*-*-*  ENCOUNTER DATE: 03/18/24 11:31 AM  PATIENT NAME: Lavon Carlos   1955    [id number]  AGE:69 y.o.      SEX: male  ENCOUNTER PROVIDER:Eva Lam MD     PRIMARY CARE PHYSICIAN: Steffen Arnett MD    DIAGNOSES:  1.  Bicuspid aortic valve-with moderate aortic valve stenosis and mild aortic valve regurgitation and dilated aortic root (Echo November 2023).  2.  Bilateral carotid artery stenosis  3.  Primary hypertension  4.  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  Dyslipidemia  6.  Obesity  7.  Melanoma in situ  8.  Hypokalemia  9.  Incidental finding of coronary calcification.    CT scan of the chest without contrast December 2023:  Heart size normal. Coronary artery calcification. No pericardial effusion. Aortic valve calcification. No thoracic aortic aneurysm.  Small hiatal hernia.    ECHOCARDIOGRAM 11/3/2023:   Moderate concentric left ventricular hypertrophy, normal left ventricular systolic function, grade 1 diastolic dysfunction.  Normal right ventricle size and systolic function.  Mild biatrial enlargement  Moderately calcified aortic valve.  Aortic valve leaflets could not be identified although history mention bicuspid aortic valve.  There was mild aortic valve regurgitation and moderate aortic valve stenosis.  Peak transaortic velocity was 3.13 m/s, mean gradient was 39 mmHg.  Mildly thickened mitral valve with mild mitral valve regurgitation.  Mild tricuspid valve regurgitation.  Mildly dilated aortic root with normal ascending aorta size.  No obvious pulmonary hypertension.  No pericardial effusion.    ECHOCARDIOGRAM 11/23/2022:    Left Ventricle: Moderate concentric left ventricular hypertrophy with prominence of the basal interventricular septum without outflow  tract gradient identified.  Hyperdynamic LV systolic function with estimated ejection fraction 70%.  No regional wall motion abnormality identified.  Normal end-diastolic pressures estimated with mild diastolic relaxation abnormality  ·  Right Ventricle: Normal right ventricular size and function.  ·  Aortic Valve: Three distinct aortic leaflets are not well seen.  There is particular thickening of the non coronary cusp.  Historically valve has been described as bicuspid.  Leaflet excursion appears reduced.  There is a measured transaortic velocity of 3.6 m/sec corresponding to peak mean gradients of 52 in 29 mmHg respectively.  Suspect moderate aortic stenosis.  There is mild central insufficiency.  ·  Mitral Valve: Mitral annular calcification with mild mitral leaflet thickening, preserved leaflet excursion and mild insufficiency.  ·  Tricuspid Valve: Normal-appearing tricuspid leaflets with preserved excursion and mild regurgitation.  Measured tricuspid regurgitant velocity of 2.9 m/sec corresponds a gradient of 33 mmHg.  With addition of 7 mm Hg for estimated right atrial pressure, estimated pulmonary pressure is moderately increased 40 mmHg.  ·  Aorta: The aortic root is mildly dilated. The ascending aorta is mildly dilated.    Echocardiogram 11/29/2019: Ejection fraction of 66% with normal diastolic parameters for age, bicuspid aortic valve with mild stenosis and mild regurgitation along with mild mitral regurgitation and trace tricuspid regurgitation and normal pulmonary pressure estimate, unchanged.     CAROTID ULTRASOUND 11/25/2022:  RIGHT:  There is <50% stenosis noted in the internal carotid artery. Plaque is  heterogenous and irregular.  Vertebral artery flow is antegrade. There is no significant subclavian artery  disease.  LEFT:  There is <50% stenosis noted in the internal carotid artery. Plaque is  homogenous and smooth.  Vertebral artery flow is antegrade. There is no significant subclavian  artery  disease.    CAROTID DUPLEX:11/29/2019: 16-49% stenosis of the right internal carotid artery with lower range of 16-49% stenosis of the left internal carotid artery, unchanged.       CURRENT ECG   No results found for this visit on 03/18/24.        CARDIOLOGY ASSESSMENT & PLAN     1. Moderate aortic valve stenosis  Echo complete w/ contrast if indicated      2. Mild aortic valve regurgitation  Echo complete w/ contrast if indicated      3. Bicuspid aortic valve  Echo complete w/ contrast if indicated      4. Essential hypertension        5. Bilateral carotid artery stenosis          Bicuspid aortic valve  Mr. Lavon Carols is overall doing well from cardiac perspective with no recent symptoms that suggest angina or heart failure.  Though his initial blood pressure was significantly elevated subsequent blood pressure measurement by me did show improvement.  He mentions that his home systolic blood pressures are now in 120s to 130s.  He is on good medical regimen.  His lipids are borderline elevated.  He is CT chest performed in December did not identify evidence of aortic aneurysm.  There was some coronary calcification noted.    Today we discussed the option of increasing the rosuvastatin to 10 mg daily from current of 5 mg daily.  He is a little hesitant so we are going to hold off on making that change right now and repeat lipid profile in 6 to 8 months.    -- We discussed about the need for follow-up echocardiogram in 1 year from previous 1 so we will schedule one for after November this year.  -- We discussed about avoiding activities and exercises that increased intrathoracic pressure significantly such as doing bench press or lifting weights greater than 40 pounds.  -- Advising him to continue eating healthy, try to maintain a healthy weight and to continue to monitor blood pressure from time to time.  Our goal is to bring systolic blood pressures consistently less than 130 mmHg and diastolic blood  pressures at or less than 70 mmHg.  If blood pressures are not at this goal predominantly he is advised to reach out to us.  -- We will plan to see him back in December after his follow-up echocardiogram.  Earlier if necessary.  -- Dietary and medical compliance are reinforced.  -- He is advised  to report any concerning symptoms such as chest pain, shortness of breath, decline in exercise tolerance or presyncope/syncope.     INTERVAL HISTORY, HISTORY OF PRESENT ILLNESS & COMPREHENSIVE VISIT INFORMATION     Lavon Carlos is here for follow-up regarding his cardiac comorbidities which include: History of bicuspid aortic valve, primary hypertension, carotid artery disease and other comorbidities.  He was last seen by me in November 2023.  He was referred to undergo CT scan of the chest to assess for thoracic aortic aneurysm.  Amlodipine was increased to 10 mg daily for blood pressure optimization.    He is here for follow-up visit today.  He mentions that since last visit he has been overall doing well.  He has had no new diagnosis or hospitalizations or significant illnesses.  From a symptom perspective he reports that he has been overall well.  He denies any recent unusual chest pain or shortness of breath or dizziness or lightheadedness or palpitations or passing out or near passing out episodes.  He mentions that he had been monitoring his blood pressures at home and they have been gradually improving with most recent blood pressures in 120s to 130s systolic.  He mentions that his blood pressure is usually high when he goes to physicians office and is wondering if this may be the case for today's elevated blood pressure.  He goes to gym regularly and exercises including exercising on treadmill and doing weights.  Denies orthopnea PND or pedal edema.    Functional capacity status: Good   (Excellent- >10 METs; Good: (7-10 METs); Moderate (4-7 METs); Poor (<= 4 METs)    Any chronic stressors: None   (feeling of poor  health, financial problems, and social isolation etc).    Tobacco or alcohol dependence: He  has never been a smoker.  He denies any significant alcohol use.  He has about 2 mugs of coffee in the morning.    His family history significant for his mother having hypertension and unspecified angina.  There is no family history of premature coronary artery disease or sudden cardiac death.    He retired from working or [employer].    Current cardiac medications: Rosuvastatin 5 mg daily amlodipine 10 mg daily daily irbesartan/HCTZ 300-12.5 mg daily omega-3 fatty acids aspirin 81 mg daily    Last recent comprehensive blood work available:   Blood work 3/13/2024:  Sodium 138  3.8 chloride 1 3 bicarb 31 BUN 18 creatinine 0.91 GFR 86  Last LFTs were from 2021 and they were normal  Recent lipid profile total cholesterol 145 triglycerides 78 HDL 43 calculated LDL 86  PSA level was elevated at 4.85  Lipid profile 12/8/2022 total cholesterol 144 triglyceride 85 HDL 53 calculated LDL 74  Hemoglobin A1c 5.7 in 2021  No recent TSH available    The 10-year ASCVD risk score (Lance WILLIAMSON, et al., 2019) is: 17.9%    Values used to calculate the score:      Age: 69 years      Sex: Male      Is Non- : No      Diabetic: No      Tobacco smoker: No      Systolic Blood Pressure: 135 mmHg      Is BP treated: Yes      HDL Cholesterol: 43 mg/dL      Total Cholesterol: 145 mg/dL  (Low risk: Less than <5%; borderline risk: ?5% to <7.5%; intermediate risk: ?7.5% to <20%; high risk:?20%)  Patient's ASCVD risk category: High risk    REVIEW OF SYSTEMS   Positive for: As noted above in HPI  Negative for: All remaining as reviewed below and in HPI.    SYSTEM SYMPTOMS REVIEWED:  General--weight change, fever, night sweats  Respiratory--cough, wheezing, shortness of breath, sputum production  Cardiovascular--chest pain, syncope, dyspnea on exertion, edema, decline in exercise tolerance, claudication   Gastrointestinal--persistent  vomiting, diarrhea, abdominal distention, blood in stool   Muscular or skeletal--joint pain or swelling   Neurologic--headaches, syncope, abnormal movement  Hematologic--history of easy bruising and bleeding   Endocrine--thyroid enlargement, heat or cold intolerance, polyuria   Psychiatric--anxiety, depression     *-*-*-*-*-*-*-*-*-*-*-*-*-*-*-*-*-*-*-*-*-*-*-*-*-*-*-*-*-*-*-*-*-*-*-*-*-*-*-*-*-*-*-*-*-*-*-*-*-*-*-*-*-*-  VITAL SIGNS     CURRENT VITAL SIGNS:   Vitals:    03/18/24 1108 03/18/24 1118   BP: 158/80 135/78   BP Location: Left arm Right arm   Patient Position: Sitting Sitting   Cuff Size: Large Standard   Pulse: 67    Temp: (!) 96.4 °F (35.8 °C)    SpO2: 98%    Weight: 109 kg (239 lb 9.6 oz)    Height: 6' (1.829 m)        BMI: Body mass index is 32.5 kg/m².    WEIGHTS:   Wt Readings from Last 25 Encounters:   03/18/24 109 kg (239 lb 9.6 oz)   01/23/24 108 kg (238 lb 9.6 oz)   11/28/23 106 kg (234 lb)   11/03/23 105 kg (231 lb 7.7 oz)   07/18/23 105 kg (231 lb)   05/17/23 107 kg (235 lb)   05/02/23 106 kg (234 lb 6.4 oz)   01/12/23 105 kg (231 lb 3.2 oz)   12/27/22 108 kg (237 lb)   12/12/22 107 kg (236 lb 3.2 oz)   11/23/22 107 kg (236 lb)   09/23/22 107 kg (236 lb)   06/13/22 109 kg (240 lb 9.6 oz)   05/23/22 110 kg (242 lb 3.2 oz)        *-*-*-*-*-*-*-*-*-*-*-*-*-*-*-*-*-*-*-*-*-*-*-*-*-*-*-*-*-*-*-*-*-*-*-*-*-*-*-*-*-*-*-*-*-*-*-*-*-*-*-*-*-*-  PHYSICAL EXAM     General Appearance:    Alert, cooperative, no distress, appears stated age, increased BMI   Head, Eyes, ENT:    No obvious abnormality, moist mucous mebranes.   Neck:   Supple, no carotid bruit or JVD   Back:     Symmetric, no curvature.   Lungs:     Respirations unlabored. Clear to auscultation bilaterally,    Chest wall:    No tenderness or deformity   Heart:    Regular rate and rhythm, S1 and S2 normal, grade 2/6 systolic murmur consistent with with aortic valve stenosis.   Abdomen:     Soft, non-tender, No obvious masses, or organomegaly  "  Extremities:   Extremities warm, no cyanosis or edema    Skin: No venostatic changes in lower extremities. Normal skin color, texture, and turgor. No rashes or lesions     *-*-*-*-*-*-*-*-*-*-*-*-*-*-*-*-*-*-*-*-*-*-*-*-*-*-*-*-*-*-*-*-*-*-*-*-*-*-*-*-*-*-*-*-*-*-*-*-*-*-*-*-*-*-  CURRENT MEDICATIONS LIST     Current Outpatient Medications:     amLODIPine (NORVASC) 10 mg tablet, Take 1 tablet (10 mg total) by mouth daily, Disp: 90 tablet, Rfl: 3    Arginine 1000 MG TABS, Take 1,000 mg by mouth in the morning, Disp: , Rfl:     Ascorbic Acid (Vitamin C) 500 MG CHEW, , Disp: , Rfl:     aspirin (ECOTRIN LOW STRENGTH) 81 mg EC tablet, Take by mouth, Disp: , Rfl:     glucosamine-chondroitin 500-400 MG tablet, Take 1 tablet by mouth in the morning , Disp: , Rfl:     irbesartan-hydrochlorothiazide (AVALIDE) 300-12.5 MG per tablet, Take 1 tablet by mouth daily, Disp: 90 tablet, Rfl: 3    Krill Oil 500 MG CAPS, , Disp: , Rfl:     Multiple Vitamins-Minerals (Ocuvite Adult 50+) CAPS, , Disp: , Rfl:     rosuvastatin (CRESTOR) 5 mg tablet, TAKE 1 TABLET DAILY, Disp: 90 tablet, Rfl: 3       ALLERGIES   No Known Allergies    *-*-*-*-*-*-*-*-*-*-*-*-*-*-*-*-*-*-*-*-*-*-*-*-*-*-*-*-*-*-*-*-*-*-*-*-*-*-*-*-*-*-*-*-*-*-*-*-*-*-*-*-*-*-  LABORATORY DATA   No results found for: \"NA\"  Potassium   Date Value Ref Range Status   03/13/2024 3.8 3.5 - 5.3 mmol/L Final   04/27/2023 4.2 3.5 - 5.3 mmol/L Final   12/08/2022 3.3 (L) 3.5 - 5.3 mmol/L Final   11/04/2021 3.6 3.5 - 5.1 mmol/L Final   03/04/2021 3.4 (L) 3.5 - 5.2 mmol/L Final     Chloride   Date Value Ref Range Status   03/13/2024 103 96 - 108 mmol/L Final   04/27/2023 103 96 - 108 mmol/L Final   12/08/2022 101 96 - 108 mmol/L Final   11/04/2021 99 98 - 107 mmol/L Final   03/04/2021 101 100 - 109 mmol/L Final     Carbon Dioxide   Date Value Ref Range Status   11/04/2021 33 (H) 22 - 32 mmol/L Final   03/04/2021 28 23 - 31 mmol/L Final     CO2   Date Value Ref Range Status   03/13/2024 " "31 21 - 32 mmol/L Final   04/27/2023 33 (H) 21 - 32 mmol/L Final   12/08/2022 34 (H) 21 - 32 mmol/L Final     BUN   Date Value Ref Range Status   03/13/2024 18 5 - 25 mg/dL Final   04/27/2023 18 5 - 25 mg/dL Final   12/08/2022 15 5 - 25 mg/dL Final   07/19/2022 19 7 - 28 mg/dL Final   11/04/2021 19 6 - 20 mg/dL Final   03/04/2021 16 7 - 28 mg/dL Final     Creatinine   Date Value Ref Range Status   03/13/2024 0.91 0.60 - 1.30 mg/dL Final     Comment:     Standardized to IDMS reference method   04/27/2023 1.15 0.60 - 1.30 mg/dL Final     Comment:     Standardized to IDMS reference method   12/08/2022 1.02 0.60 - 1.30 mg/dL Final     Comment:     Standardized to IDMS reference method   07/19/2022 1.06 0.53 - 1.30 mg/dL Final   11/04/2021 1.2 0.6 - 1.2 mg/dL Final   03/04/2021 1.05 0.53 - 1.30 mg/dL Final     EGFR   Date Value Ref Range Status   11/04/2021 65 >=60 mL/min Final     Comment:     If patient is , multiply estimated GFR by 1.159.     eGFRcr   Date Value Ref Range Status   07/19/2022 77 >59 Final     eGFR   Date Value Ref Range Status   03/13/2024 86 ml/min/1.73sq m Final   04/27/2023 65 ml/min/1.73sq m Final   12/08/2022 76 ml/min/1.73sq m Final     Calcium   Date Value Ref Range Status   03/13/2024 9.3 8.4 - 10.2 mg/dL Final   04/27/2023 9.2 8.4 - 10.2 mg/dL Final   12/08/2022 9.4 8.3 - 10.1 mg/dL Final   11/04/2021 9.6 8.4 - 10.2 mg/dL Final   03/04/2021 9.2 8.5 - 10.1 mg/dL Final     AST   Date Value Ref Range Status   11/04/2021 35 10 - 50 U/L Final   03/04/2021 29 <41 U/L Final     ALT   Date Value Ref Range Status   11/04/2021 24 10 - 50 U/L Final   03/04/2021 39 <56 U/L Final     Alkaline Phosphatase   Date Value Ref Range Status   11/04/2021 58 35 - 130 U/L Final   03/04/2021 54 35 - 120 U/L Final     No results found for: \"WBC\", \"HGB\", \"PLT\"  No results found for: \"PT\", \"PTT\", \"INR\"  No results found for: \"CK\", \"CKMB\", \"DIGOXIN\"  No results found for: \"TSH\"  No results found for: " "\"CHOL\"   Hemoglobin A1C   Date Value Ref Range Status   03/04/2021 5.7 (H) <5.7 % Final     Comment:     Reference Range  Non-diabetic                     <5.7  Pre-diabetic                     5.7-6.4  Diabetic                         >=6.5  ADA target for diabetic control  <=7     No results found for: \"BLOODCX\", \"SPUTUMCULTUR\", \"GRAMSTAIN\", \"URINECX\", \"WOUNDCULT\", \"BODYFLUIDCUL\", \"MRSACULTURE\", \"INFLUAPCR\", \"INFLUBPCR\", \"RSVPCR\", \"LEGIONELLAUR\", \"CDIFFTOXINB\"   *-*-*-*-*-*-*-*-*-*-*-*-*-*-*-*-*-*-*-*-*-*-*-*-*-*-*-*-*-*-*-*-*-*-*-*-*-*-*-*-*-*-*-*-*-*-*-*-*-*-*-*-*-*-  SIGNATURES:   @SIG@   Eva Lam MD; [facility]    *-*-*-*-*-*-*-*-*-*-*-*-*-*-*-*-*-*-*-*-*-*-*-*-*-*-*-*-*-*-*-*-*-*-*-*-*-*-*-*-*-*-*-*-*-*-*-*-*-*-*-*-*-*-  PAST MEDICAL HISTORY:  Past Medical History:   Diagnosis Date    Bicuspid aortic valve     Carotid stenosis     Hypertension     Uncomplicated. Well-controlled overall.    PAST SURGICAL HISTORY:  Past Surgical History:   Procedure Laterality Date    VASECTOMY           FAMILY HISTORY:  Family History   Problem Relation Age of Onset    Hypertension Mother     Arthritis Mother     Stroke Mother         Age 80    Completed Suicide  Father     No Known Problems Sister     Hypertension Brother     Hypertension Brother     Prostate cancer Paternal Uncle     SOCIAL HISTORY:  Social History     Tobacco Use   Smoking Status Never    Passive exposure: Never   Smokeless Tobacco Never      Social History     Substance and Sexual Activity   Alcohol Use Not Currently     Social History     Substance and Sexual Activity   Drug Use Never    @[facility]@   *-*-*-*-*-*-*-*-*-*-*-*-*-*-*-*-*-*-*-*-*-*-*-*-*-*-*-*-*-*-*-*-*-*-*-*-*-*-*-*-*-*-*-*-*-*-*-*-*-*-*-*-*-*     "

## 2024-03-18 NOTE — ASSESSMENT & PLAN NOTE
Mr. Lavon Carlos is overall doing well from cardiac perspective with no recent symptoms that suggest angina or heart failure.  Though his initial blood pressure was significantly elevated subsequent blood pressure measurement by me did show improvement.  He mentions that his home systolic blood pressures are now in 120s to 130s.  He is on good medical regimen.  His lipids are borderline elevated.  He is CT chest performed in December did not identify evidence of aortic aneurysm.  There was some coronary calcification noted.    Today we discussed the option of increasing the rosuvastatin to 10 mg daily from current of 5 mg daily.  He is a little hesitant so we are going to hold off on making that change right now and repeat lipid profile in 6 to 8 months.    -- We discussed about the need for follow-up echocardiogram in 1 year from previous 1 so we will schedule one for after November this year.  -- We discussed about avoiding activities and exercises that increased intrathoracic pressure significantly such as doing bench press or lifting weights greater than 40 pounds.  -- Advising him to continue eating healthy, try to maintain a healthy weight and to continue to monitor blood pressure from time to time.  Our goal is to bring systolic blood pressures consistently less than 130 mmHg and diastolic blood pressures at or less than 70 mmHg.  If blood pressures are not at this goal predominantly he is advised to reach out to us.  -- We will plan to see him back in December after his follow-up echocardiogram.  Earlier if necessary.  -- Dietary and medical compliance are reinforced.  -- He is advised  to report any concerning symptoms such as chest pain, shortness of breath, decline in exercise tolerance or presyncope/syncope.

## 2024-03-18 NOTE — PATIENT INSTRUCTIONS
CARDIOLOGY ASSESSMENT & PLAN     1. Moderate aortic valve stenosis  Echo complete w/ contrast if indicated      2. Mild aortic valve regurgitation  Echo complete w/ contrast if indicated      3. Bicuspid aortic valve  Echo complete w/ contrast if indicated      4. Essential hypertension        5. Bilateral carotid artery stenosis          Bicuspid aortic valve  Mr. Lavon Carlos is overall doing well from cardiac perspective with no recent symptoms that suggest angina or heart failure.  Though his initial blood pressure was significantly elevated subsequent blood pressure measurement by me did show improvement.  He mentions that his home systolic blood pressures are now in 120s to 130s.  He is on good medical regimen.  His lipids are borderline elevated.  He is CT chest performed in December did not identify evidence of aortic aneurysm.  There was some coronary calcification noted.    Today we discussed the option of increasing the rosuvastatin to 10 mg daily from current of 5 mg daily.  He is a little hesitant so we are going to hold off on making that change right now and repeat lipid profile in 6 to 8 months.    -- We discussed about the need for follow-up echocardiogram in 1 year from previous 1 so we will schedule one for after November this year.  -- We discussed about avoiding activities and exercises that increased intrathoracic pressure significantly such as doing bench press or lifting weights greater than 40 pounds.  -- Advising him to continue eating healthy, try to maintain a healthy weight and to continue to monitor blood pressure from time to time.  Our goal is to bring systolic blood pressures consistently less than 130 mmHg and diastolic blood pressures at or less than 70 mmHg.  If blood pressures are not at this goal predominantly he is advised to reach out to us.  -- We will plan to see him back in December after his follow-up echocardiogram.  Earlier if necessary.  -- Liver function test  blood work in the last 2 to 3 years he should have this test done with his next follow-up blood work with his primary physician.  -- Dietary and medical compliance are reinforced.  -- He is advised  to report any concerning symptoms such as chest pain, shortness of breath, decline in exercise tolerance or presyncope/syncope.

## 2024-04-19 DIAGNOSIS — I10 ESSENTIAL HYPERTENSION: ICD-10-CM

## 2024-04-19 RX ORDER — IRBESARTAN AND HYDROCHLOROTHIAZIDE 300; 12.5 MG/1; MG/1
1 TABLET, FILM COATED ORAL DAILY
Qty: 90 TABLET | Refills: 3 | Status: CANCELLED | OUTPATIENT
Start: 2024-04-19

## 2024-04-22 RX ORDER — IRBESARTAN AND HYDROCHLOROTHIAZIDE 300; 12.5 MG/1; MG/1
1 TABLET, FILM COATED ORAL DAILY
Qty: 90 TABLET | Refills: 3 | Status: SHIPPED | OUTPATIENT
Start: 2024-04-22

## 2024-06-11 ENCOUNTER — OFFICE VISIT (OUTPATIENT)
Dept: FAMILY MEDICINE CLINIC | Facility: CLINIC | Age: 69
End: 2024-06-11
Payer: COMMERCIAL

## 2024-06-11 VITALS
BODY MASS INDEX: 31.99 KG/M2 | OXYGEN SATURATION: 98 % | HEIGHT: 72 IN | SYSTOLIC BLOOD PRESSURE: 158 MMHG | HEART RATE: 70 BPM | WEIGHT: 236.2 LBS | DIASTOLIC BLOOD PRESSURE: 70 MMHG

## 2024-06-11 DIAGNOSIS — R05.1 ACUTE COUGH: Primary | ICD-10-CM

## 2024-06-11 PROCEDURE — G2211 COMPLEX E/M VISIT ADD ON: HCPCS | Performed by: INTERNAL MEDICINE

## 2024-06-11 PROCEDURE — 99213 OFFICE O/P EST LOW 20 MIN: CPT | Performed by: INTERNAL MEDICINE

## 2024-06-11 RX ORDER — CODEINE PHOSPHATE/GUAIFENESIN 10-100MG/5
5 LIQUID (ML) ORAL 3 TIMES DAILY PRN
Qty: 118 ML | Refills: 0 | Status: SHIPPED | OUTPATIENT
Start: 2024-06-11

## 2024-06-11 NOTE — PROGRESS NOTES
Ambulatory Visit  Name: Lavon Carlos      : 1955      MRN: [id number]  Encounter Provider: Steffen Arnett MD  Encounter Date: 2024   Encounter department: [facility] PRIMARY CARE    Assessment & Plan  1. Viral infection.  The patient's symptoms, which are not reminiscent of the aforementioned infection he experienced 3 weeks ago, suggest a viral etiology. A prescription for Robitussin with Codeine, to be taken thrice daily, has been issued. The patient has been informed that the typical recovery period for this condition is between 2 to 3 weeks.  We discussed the role of antibiotics in the setting but we both agreed that this is a viral infection and is no role for antibiotics currently.  However, if he worsens by the end of the week or becomes febrile, we would consider antibiotic treatment.         History of Present Illness     History of Present Illness  The patient presents for evaluation of cough and runny nose.    The patient began experiencing symptoms last Thursday, approximately 4 to 5 days prior, characterized by a dry, tickle in his throat, which subsequently led to a mild cough. Concurrently, he developed a runny nose. The cough has been severe enough to disrupt his sleep, with the most recent episode occurring at approximately 1 a.m. yesterday. Despite engaging in strenuous activities such as yard work and washing his porch yesterday, the cough persists. He denies experiencing fever, chills, sweats, anosmia, ageusia, abdominal pain, nausea, vomiting, or diarrhea. He also reports a mild sore throat, which he attributes to his cough. He has been self-medicating with a preparation containing guaifenesin and dextromethorphan, which initially provided relief, but the subsequent day, the symptoms persisted. He denies any recent exposure to sick individuals. He is planning a vacation with his family this coming Saturday and is concerned about potential exposure to his  grandchildren.     Review of Systems  Objective     /70 (BP Location: Right arm, Patient Position: Sitting, Cuff Size: Standard)   Pulse 70   Ht 6' (1.829 m)   Wt 107 kg (236 lb 3.2 oz)   SpO2 98%   BMI 32.03 kg/m²     Physical Exam    Physical Exam  Constitutional:       General: He is not in acute distress.     Appearance: Normal appearance. He is not ill-appearing.      Comments: He sounds slightly dysphonic   HENT:      Right Ear: Tympanic membrane normal.      Left Ear: Tympanic membrane normal.      Nose: Congestion present.      Mouth/Throat:      Mouth: Mucous membranes are moist.      Pharynx: No oropharyngeal exudate or posterior oropharyngeal erythema.   Pulmonary:      Effort: Pulmonary effort is normal.      Breath sounds: Normal breath sounds.   Neurological:      Mental Status: He is alert.       Administrative Statements          Composite Graft Text: The defect edges were debeveled with a #15 scalpel blade.  Given the location of the defect, shape of the defect, the proximity to free margins and the fact the defect was full thickness a composite graft was deemed most appropriate.  The defect was outline and then transferred to the donor site.  A full thickness graft was then excised from the donor site. The graft was then placed in the primary defect, oriented appropriately and then sutured into place.  The secondary defect was then repaired using a primary closure.

## 2024-07-23 ENCOUNTER — OFFICE VISIT (OUTPATIENT)
Dept: FAMILY MEDICINE CLINIC | Facility: CLINIC | Age: 69
End: 2024-07-23
Payer: COMMERCIAL

## 2024-07-23 VITALS
HEART RATE: 66 BPM | HEIGHT: 72 IN | DIASTOLIC BLOOD PRESSURE: 62 MMHG | SYSTOLIC BLOOD PRESSURE: 124 MMHG | WEIGHT: 238.2 LBS | BODY MASS INDEX: 32.26 KG/M2 | OXYGEN SATURATION: 94 %

## 2024-07-23 DIAGNOSIS — I10 ESSENTIAL HYPERTENSION: Primary | ICD-10-CM

## 2024-07-23 DIAGNOSIS — R97.20 ELEVATED PSA: ICD-10-CM

## 2024-07-23 DIAGNOSIS — Q23.1 BICUSPID AORTIC VALVE: ICD-10-CM

## 2024-07-23 PROCEDURE — G0439 PPPS, SUBSEQ VISIT: HCPCS | Performed by: INTERNAL MEDICINE

## 2024-07-23 PROCEDURE — 99214 OFFICE O/P EST MOD 30 MIN: CPT | Performed by: INTERNAL MEDICINE

## 2024-07-23 NOTE — ASSESSMENT & PLAN NOTE
He is asymptomatic and his exam is unchanged.  He will continue to follow with cardiology and get annual cardiograms.

## 2024-07-23 NOTE — PROGRESS NOTES
Ambulatory Visit  Name: Lavon Carlos      : 1955      MRN: [id number]  Encounter Provider: Steffen Arnett MD  Encounter Date: 2024   Encounter department: [facility] PRIMARY CARE    Assessment & Plan  Essential hypertension  Good BP control on current regimen.  Bicuspid aortic valve  He is asymptomatic and his exam is unchanged.  He will continue to follow with cardiology and get annual cardiograms.  Elevated PSA  His PSA actually did not rise significantly over 3 years but certainly is above normal at 4.85.  Will repeat the PSA.  Consider urology referral.        Preventive health issues were discussed with patient, and age appropriate screening tests were ordered as noted in patient's After Visit Summary. Personalized health advice and appropriate referrals for health education or preventive services given if needed, as noted in patient's After Visit Summary.    History of Present Illness        Here for routine follow up. He's doing well. He got over the cough with the codeine cough syrup. Not having any chest pain, shortness of breath, PND, orthopnea.     Review of Systems  Medical History Reviewed by provider this encounter:       Annual Wellness Visit Questionnaire   Lavon is here for his Subsequent Wellness visit.     Health Risk Assessment:   Patient rates overall health as very good. Patient feels that their physical health rating is slightly better. Patient is satisfied with their life. Eyesight was rated as same. Hearing was rated as same. Patient feels that their emotional and mental health rating is same. Patients states they are never, rarely angry. Patient states they are never, rarely unusually tired/fatigued. Pain experienced in the last 7 days has been some. Patient's pain rating has been 2/10. Patient states that he has experienced no weight loss or gain in last 6 months. Some right forearm pain from playing golf.     Depression Screening:   PHQ-2 Score: 0  PHQ-9 Score:  0      Fall Risk Screening:   In the past year, patient has experienced: no history of falling in past year      Home Safety:  Patient does not have trouble with stairs inside or outside of their home. Patient has working smoke alarms and has working carbon monoxide detector. Home safety hazards include: none.     Nutrition:   Current diet is Low Cholesterol, No Added Salt and Limited junk food.     Medications:   Patient is currently taking over-the-counter supplements. OTC medications include: see medication list. Patient is able to manage medications.     Activities of Daily Living (ADLs)/Instrumental Activities of Daily Living (IADLs):   Walk and transfer into and out of bed and chair?: Yes  Dress and groom yourself?: Yes    Bathe or shower yourself?: Yes    Feed yourself? Yes  Do your laundry/housekeeping?: Yes  Manage your money, pay your bills and track your expenses?: Yes  Make your own meals?: Yes    Do your own shopping?: Yes    Previous Hospitalizations:   Any hospitalizations or ED visits within the last 12 months?: No      Advance Care Planning:   Living will: No    Advanced directive: Yes    Advanced directive counseling given: Yes    End of Life Decisions reviewed with patient: Yes      Cognitive Screening:   Provider or family/friend/caregiver concerned regarding cognition?: No    PREVENTIVE SCREENINGS      Cardiovascular Screening:    General: Screening Not Indicated and History Lipid Disorder      Diabetes Screening:     General: Screening Current      Colorectal Cancer Screening:     General: Screening Current      Prostate Cancer Screening:    General: Screening Current      Osteoporosis Screening:    General: Screening Not Indicated      Abdominal Aortic Aneurysm (AAA) Screening:    Risk factors include: age between 65-76 yo        General: Screening Not Indicated      Lung Cancer Screening:     General: Screening Not Indicated      Hepatitis C Screening:    General: Screening  Current    Screening, Brief Intervention, and Referral to Treatment (SBIRT)    Screening  Typical number of drinks in a day: 0  Typical number of drinks in a week: 0  Interpretation: Low risk drinking behavior.    Single Item Drug Screening:  How often have you used an illegal drug (including marijuana) or a prescription medication for non-medical reasons in the past year? never    Single Item Drug Screen Score: 0  Interpretation: Negative screen for possible drug use disorder    Social Determinants of Health     Financial Resource Strain: Low Risk  (7/18/2023)    Overall Financial Resource Strain (CARDIA)     Difficulty of Paying Living Expenses: Not hard at all   Food Insecurity: No Food Insecurity (7/23/2024)    Hunger Vital Sign     Worried About Running Out of Food in the Last Year: Never true     Ran Out of Food in the Last Year: Never true   Transportation Needs: No Transportation Needs (7/23/2024)    PRAPARE - Transportation     Lack of Transportation (Medical): No     Lack of Transportation (Non-Medical): No   Housing Stability: Low Risk  (7/23/2024)    Housing Stability Vital Sign     Unable to Pay for Housing in the Last Year: No     Number of Times Moved in the Last Year: 1     Homeless in the Last Year: No   Utilities: Not At Risk (7/23/2024)    [facility] Utilities     Threatened with loss of utilities: No     No results found.    Objective     /62 (BP Location: Right arm, Patient Position: Sitting, Cuff Size: Large)   Pulse 66   Ht 6' (1.829 m)   Wt 108 kg (238 lb 3.2 oz)   SpO2 94%   BMI 32.31 kg/m²     Physical Exam  Constitutional:       General: He is not in acute distress.     Appearance: Normal appearance. He is well-developed. He is not ill-appearing.   Neck:      Vascular: No JVD.   Cardiovascular:      Rate and Rhythm: Normal rate and regular rhythm.      Heart sounds: Normal heart sounds. No murmur heard.     No friction rub. No gallop.   Pulmonary:      Breath sounds: Normal breath  sounds.   Abdominal:      General: Bowel sounds are normal. There is no distension.      Palpations: Abdomen is soft. There is no mass.   Musculoskeletal:         General: No swelling.   Neurological:      Mental Status: He is alert.

## 2024-08-12 ENCOUNTER — APPOINTMENT (OUTPATIENT)
Dept: LAB | Facility: HOSPITAL | Age: 69
End: 2024-08-12
Payer: COMMERCIAL

## 2024-08-12 DIAGNOSIS — R97.20 ELEVATED PSA: ICD-10-CM

## 2024-08-12 LAB — PSA SERPL-MCNC: 4.9 NG/ML (ref 0–4)

## 2024-08-12 PROCEDURE — 84153 ASSAY OF PSA TOTAL: CPT

## 2024-08-12 PROCEDURE — 36415 COLL VENOUS BLD VENIPUNCTURE: CPT

## 2024-09-10 DIAGNOSIS — I10 ESSENTIAL HYPERTENSION: ICD-10-CM

## 2024-09-10 DIAGNOSIS — E78.2 MIXED HYPERLIPIDEMIA: ICD-10-CM

## 2024-09-10 RX ORDER — AMLODIPINE BESYLATE 10 MG/1
10 TABLET ORAL DAILY
Qty: 90 TABLET | Refills: 1 | Status: SHIPPED | OUTPATIENT
Start: 2024-09-10

## 2024-09-10 RX ORDER — ROSUVASTATIN CALCIUM 5 MG/1
5 TABLET, COATED ORAL DAILY
Qty: 90 TABLET | Refills: 1 | Status: SHIPPED | OUTPATIENT
Start: 2024-09-10

## 2024-09-18 ENCOUNTER — HOSPITAL ENCOUNTER (OUTPATIENT)
Dept: NON INVASIVE DIAGNOSTICS | Facility: HOSPITAL | Age: 69
Discharge: HOME | End: 2024-09-18
Attending: INTERNAL MEDICINE
Payer: COMMERCIAL

## 2024-09-18 VITALS
BODY MASS INDEX: 32.23 KG/M2 | HEIGHT: 72 IN | DIASTOLIC BLOOD PRESSURE: 62 MMHG | SYSTOLIC BLOOD PRESSURE: 124 MMHG | WEIGHT: 238 LBS | HEART RATE: 70 BPM

## 2024-09-18 DIAGNOSIS — Q23.1 BICUSPID AORTIC VALVE: ICD-10-CM

## 2024-09-18 DIAGNOSIS — I35.1 MILD AORTIC VALVE REGURGITATION: ICD-10-CM

## 2024-09-18 DIAGNOSIS — I35.0 MODERATE AORTIC VALVE STENOSIS: ICD-10-CM

## 2024-09-18 LAB
AORTIC ROOT: 4 CM
AORTIC VALVE MEAN VELOCITY: 26.5 M/S
APICAL FOUR CHAMBER EJECTION FRACTION: 75 %
ASCENDING AORTA: 3.2 CM
AV AREA BY CONTINUOUS VTI: 1 CM2
AV AREA PEAK VELOCITY: 0.8 CM2
AV LVOT MEAN GRADIENT: 5 MMHG
AV LVOT PEAK GRADIENT: 7 MMHG
AV MEAN GRADIENT: 31 MMHG
AV PEAK GRADIENT: 52 MMHG
AV VALVE AREA: 0.99 CM2
AV VELOCITY RATIO: 0.37
BSA FOR ECHO PROCEDURE: 2.29 M2
DOP CALC AO PEAK VEL: 3.62 M/S
DOP CALC AO VTI: 83.93 CM
DOP CALC LVOT AREA: 2.27 CM2
DOP CALC LVOT CARDIAC INDEX: 2.43 L/MIN/M2
DOP CALC LVOT CARDIAC OUTPUT: 5.57 L/MIN
DOP CALC LVOT DIAMETER: 1.7 CM
DOP CALC LVOT PEAK VEL VTI: 36.79 CM
DOP CALC LVOT PEAK VEL: 1.34 M/S
DOP CALC LVOT STROKE INDEX: 37.1 ML/M2
DOP CALC LVOT STROKE VOLUME: 83.46
E WAVE DECELERATION TIME: 172 MS
E/A RATIO: 1.08
FRACTIONAL SHORTENING: 42 (ref 28–44)
INTERVENTRICULAR SEPTUM IN DIASTOLE (PARASTERNAL SHORT AXIS VIEW): 1.5 CM
INTERVENTRICULAR SEPTUM: 1.5 CM (ref 0.6–1.1)
LAAS-AP2: 21 CM2
LAAS-AP4: 25.2 CM2
LEFT ATRIUM AREA SYSTOLE SINGLE PLANE A4C: 25.6 CM2
LEFT ATRIUM SIZE: 3.6 CM
LEFT ATRIUM VOLUME (MOD BIPLANE): 73 ML
LEFT ATRIUM VOLUME INDEX (MOD BIPLANE): 31.9 ML/M2
LEFT INTERNAL DIMENSION IN SYSTOLE: 2.6 CM (ref 2.1–4)
LEFT VENTRICLE DIASTOLIC VOLUME (MOD BIPLANE): 153 ML
LEFT VENTRICLE DIASTOLIC VOLUME INDEX (MOD BIPLANE): 66.8 ML/M2
LEFT VENTRICLE SYSTOLIC VOLUME (MOD BIPLANE): 34 ML
LEFT VENTRICLE SYSTOLIC VOLUME INDEX (MOD BIPLANE): 14.8 ML/M2
LEFT VENTRICULAR INTERNAL DIMENSION IN DIASTOLE: 4.5 CM (ref 3.5–6)
LEFT VENTRICULAR POSTERIOR WALL IN END DIASTOLE: 1.5 CM
LEFT VENTRICULAR STROKE VOLUME: 67 ML
LV EF: 78 %
LVSV (TEICH): 67 ML
MV E'TISSUE VEL-LAT: 8 CM/S
MV E'TISSUE VEL-SEP: 7 CM/S
MV PEAK A VEL: 0.8 M/S
MV PEAK E VEL: 86 CM/S
MV STENOSIS PRESSURE HALF TIME: 50 MS
MV VALVE AREA P 1/2 METHOD: 4.4
RA PRESSURE ESTIMATED: 3 MMHG
RIGHT ATRIUM AREA SYSTOLE A4C: 20.5 CM2
RIGHT VENTRICLE ID DIMENSION: 5.4 CM
RV PSP: 22 MMHG
SL CV LEFT ATRIUM LENGTH A2C: 5.7 CM
SL CV PED ECHO LEFT VENTRICLE DIASTOLIC VOLUME (MOD BIPLANE) 2D: 92 ML
SL CV PED ECHO LEFT VENTRICLE SYSTOLIC VOLUME (MOD BIPLANE) 2D: 24 ML
TR MAX PG: 19 MMHG
TR PEAK VELOCITY: 2.2 M/S
TRICUSPID ANNULAR PLANE SYSTOLIC EXCURSION: 2.1 CM
TRICUSPID VALVE PEAK REGURGITATION VELOCITY: 2.19 M/S

## 2024-09-18 PROCEDURE — 93306 TTE W/DOPPLER COMPLETE: CPT

## 2024-12-13 ENCOUNTER — OFFICE VISIT (OUTPATIENT)
Dept: CARDIOLOGY CLINIC | Facility: CLINIC | Age: 69
End: 2024-12-13
Payer: COMMERCIAL

## 2024-12-13 VITALS
HEART RATE: 64 BPM | WEIGHT: 241 LBS | HEIGHT: 72 IN | DIASTOLIC BLOOD PRESSURE: 78 MMHG | BODY MASS INDEX: 32.64 KG/M2 | SYSTOLIC BLOOD PRESSURE: 146 MMHG | OXYGEN SATURATION: 98 % | TEMPERATURE: 98.2 F

## 2024-12-13 DIAGNOSIS — I35.1 MILD AORTIC VALVE REGURGITATION: ICD-10-CM

## 2024-12-13 DIAGNOSIS — I65.23 BILATERAL CAROTID ARTERY STENOSIS: ICD-10-CM

## 2024-12-13 DIAGNOSIS — I35.0 MODERATE AORTIC VALVE STENOSIS: Primary | ICD-10-CM

## 2024-12-13 DIAGNOSIS — R06.09 DOE (DYSPNEA ON EXERTION): ICD-10-CM

## 2024-12-13 DIAGNOSIS — Q23.81 BICUSPID AORTIC VALVE: ICD-10-CM

## 2024-12-13 DIAGNOSIS — I10 ESSENTIAL HYPERTENSION: ICD-10-CM

## 2024-12-13 PROCEDURE — 99214 OFFICE O/P EST MOD 30 MIN: CPT | Performed by: INTERNAL MEDICINE

## 2024-12-13 PROCEDURE — 93000 ELECTROCARDIOGRAM COMPLETE: CPT | Performed by: INTERNAL MEDICINE

## 2024-12-13 PROCEDURE — G2211 COMPLEX E/M VISIT ADD ON: HCPCS | Performed by: INTERNAL MEDICINE

## 2024-12-13 NOTE — ASSESSMENT & PLAN NOTE
Blood pressure is slightly on the higher side.  I have advised him to keep a regular home blood pressure diary and target blood pressure is 130/80 or less.  Once he has finished his current irbesartan HCTZ dosage, I would recommend increasing it to 300/25 mg daily.  I have also advised him to increase dietary products rich in potassium.

## 2024-12-13 NOTE — ASSESSMENT & PLAN NOTE
Echocardiogram from September 2024:    Left Ventricle: Left ventricular cavity size is normal. There is mild concentric hypertrophy. The left ventricular ejection fraction is >70 %. Systolic function is hyperdynamic. Wall motion is normal. Diastolic function is normal.    Aortic Valve: The aortic valve is congenitally bicuspid with commissural fusion of the right-left coronary cusp. The leaflets are moderately calcified. There is mild regurgitation. There is moderate stenosis.    Mitral Valve: There is mild regurgitation.    Aorta: The aortic root is mildly dilated. Ao root is 4.00 cm    We will continue with annual echocardiogram follow-up.

## 2024-12-13 NOTE — ASSESSMENT & PLAN NOTE
Most recent echocardiogram from September 2024 shows mild aortic regurgitation which is unchanged from his 2022 and 2023 study report.  Will continue to follow with an annual echocardiogram.

## 2024-12-13 NOTE — ASSESSMENT & PLAN NOTE
Echocardiogram from September 2024:    Left Ventricle: Left ventricular cavity size is normal. There is mild concentric hypertrophy. The left ventricular ejection fraction is >70 %. Systolic function is hyperdynamic. Wall motion is normal. Diastolic function is normal.    Aortic Valve: The aortic valve is congenitally bicuspid with commissural fusion of the right-left coronary cusp. The leaflets are moderately calcified. There is mild regurgitation. There is moderate stenosis.    Mitral Valve: There is mild regurgitation.    Aorta: The aortic root is mildly dilated. Ao root is 4.00 cm    His aortic valve gradient is similar to his 2022 study report.  He is fairly active, however we do not have any objective evidence of his functional capacity.  I would recommend a treadmill stress test to see his baseline functional capacity as that will help us in determining appropriate timing for aortic valve intervention.

## 2024-12-13 NOTE — PROGRESS NOTES
[facility] [place]  [address]  [address]  [facility] [id number] [phone]    Patient Name: Lavon Carlos  YOB: 1955 ;male  MR No: [id number]        Diagnosis ICD-10-CM Associated Orders   1. Moderate aortic valve stenosis  I35.0 Stress test only, exercise      2. Mild aortic valve regurgitation  I35.1 Stress test only, exercise      3. Bicuspid aortic valve  Q23.81 Stress test only, exercise      4. Essential hypertension  I10 Stress test only, exercise      5. Bilateral carotid artery stenosis  I65.23 VAS carotid complete study     Lipid panel      6. TINEO (dyspnea on exertion)  R06.09 Stress test only, exercise           Assessment and recommendations:    1. Moderate aortic valve stenosis  Assessment & Plan:  Echocardiogram from September 2024:    Left Ventricle: Left ventricular cavity size is normal. There is mild concentric hypertrophy. The left ventricular ejection fraction is >70 %. Systolic function is hyperdynamic. Wall motion is normal. Diastolic function is normal.    Aortic Valve: The aortic valve is congenitally bicuspid with commissural fusion of the right-left coronary cusp. The leaflets are moderately calcified. There is mild regurgitation. There is moderate stenosis.    Mitral Valve: There is mild regurgitation.    Aorta: The aortic root is mildly dilated. Ao root is 4.00 cm    His aortic valve gradient is similar to his 2022 study report.  He is fairly active, however we do not have any objective evidence of his functional capacity.  I would recommend a treadmill stress test to see his baseline functional capacity as that will help us in determining appropriate timing for aortic valve intervention.  Orders:  -     Stress test only, exercise; Future; Expected date: 12/13/2024  2. Mild aortic valve regurgitation  Assessment & Plan:  Most recent echocardiogram from September 2024 shows mild aortic regurgitation which is  unchanged from his 2022 and 2023 study report.  Will continue to follow with an annual echocardiogram.  Orders:  -     Stress test only, exercise; Future; Expected date: 12/13/2024  3. Bicuspid aortic valve  Assessment & Plan:  Echocardiogram from September 2024:    Left Ventricle: Left ventricular cavity size is normal. There is mild concentric hypertrophy. The left ventricular ejection fraction is >70 %. Systolic function is hyperdynamic. Wall motion is normal. Diastolic function is normal.    Aortic Valve: The aortic valve is congenitally bicuspid with commissural fusion of the right-left coronary cusp. The leaflets are moderately calcified. There is mild regurgitation. There is moderate stenosis.    Mitral Valve: There is mild regurgitation.    Aorta: The aortic root is mildly dilated. Ao root is 4.00 cm    We will continue with annual echocardiogram follow-up.  Orders:  -     Stress test only, exercise; Future; Expected date: 12/13/2024  4. Essential hypertension  Assessment & Plan:  Blood pressure is slightly on the higher side.  I have advised him to keep a regular home blood pressure diary and target blood pressure is 130/80 or less.  Once he has finished his current irbesartan HCTZ dosage, I would recommend increasing it to 300/25 mg daily.  I have also advised him to increase dietary products rich in potassium.  Orders:  -     Stress test only, exercise; Future; Expected date: 12/13/2024  5. Bilateral carotid artery stenosis  Assessment & Plan:  Last carotid duplex in 2022 showed mild less than 50% bilateral carotid stenosis.  Patient is due for an updated carotid duplex study.  His most recent LDL from March 2024 was above target at 86 mg/dL.  Recheck lipids now and then we will adjust his statins.  Orders:  -     VAS carotid complete study; Future; Expected date: 12/13/2024  -     Lipid panel; Future  6. TINEO (dyspnea on exertion)  Assessment & Plan:  His functional capacity is quite good objectively but  he does have mild dyspnea on moderate to significant exertion.  We do not have any treadmill stress test on record to have an objective evidence of his functional capacity.  We will schedule him for baseline treadmill stress test as that will help us determine the timing for his aortic valve intervention as well.  Orders:  -     Stress test only, exercise; Future; Expected date: 12/13/2024           CHIEF COMPLAINT:      Aortic stenosis, hypertension    HPI:   69-year-old male with past medical history significant for bicuspid arctic valve, moderate aortic stenosis, mild aortic regurgitation, mildly enlarged aortic root, hypertension, presents for routine follow-up visit.  Patient remains very active and does regular exercises and denies any exertional chest pain, palpitation or syncope.  He only admits to mild dyspnea on moderate exertion.    Past Medical History:   Diagnosis Date    Bicuspid aortic valve     Carotid stenosis     Hypertension     Uncomplicated. Well-controlled overall.          CURRENT  MEDICATIONS:      Current Outpatient Medications:     amLODIPine (NORVASC) 10 mg tablet, TAKE 1 TABLET DAILY, Disp: 90 tablet, Rfl: 1    Arginine 1000 MG TABS, Take 1,000 mg by mouth in the morning, Disp: , Rfl:     Ascorbic Acid (Vitamin C) 500 MG CHEW, , Disp: , Rfl:     aspirin (ECOTRIN LOW STRENGTH) 81 mg EC tablet, Take by mouth, Disp: , Rfl:     glucosamine-chondroitin 500-400 MG tablet, Take 1 tablet by mouth in the morning , Disp: , Rfl:     irbesartan-hydrochlorothiazide (AVALIDE) 300-12.5 MG per tablet, Take 1 tablet by mouth daily, Disp: 90 tablet, Rfl: 3    Krill Oil 500 MG CAPS, , Disp: , Rfl:     rosuvastatin (CRESTOR) 5 mg tablet, TAKE 1 TABLET DAILY, Disp: 90 tablet, Rfl: 1    ALLERGIES  No Known Allergies    Lab Results   Component Value Date    LDLCALC 86 03/13/2024    LDLCALC 74 12/08/2022    HDL 43 03/13/2024    HDL 53 12/08/2022    CHOLESTEROL 145 03/13/2024    CHOLESTEROL 144 12/08/2022    TRIG  78 03/13/2024    TRIG 85 12/08/2022    CREATININE 0.91 03/13/2024    CREATININE 1.15 04/27/2023    K 3.8 03/13/2024    K 4.2 04/27/2023    SODIUM 138 03/13/2024    SODIUM 141 04/27/2023       I have personally reviewed the most recent ECG EKG from today was reviewed and showed normal sinus rhythm at 60 beats a minute with nonspecific T wave abnormality.      REVIEW OF SYSTEMS   Positive for: As per HPI  Negative for: All remaining as reviewed below and in HPI.    SYSTEM SYMPTOMS REVIEWED:  General--weight change, fever, night sweats  Respiratory--cough, wheezing, shortness of breath, sputum production  Cardiovascular--chest pain, syncope, dyspnea on exertion, edema, decline in exercise tolerance, claudication   Gastrointestinal--persistent vomiting, diarrhea, abdominal distention, blood in stool   Muscular or skeletal--joint pain or swelling   Neurologic--headaches, syncope, abnormal movement  Hematologic--history of easy bruising and bleeding   Endocrine--thyroid enlargement, heat or cold intolerance, polyuria   Psychiatric--anxiety, depression     General physical examination:    General appearance: Alert, no acute distress, appears stated age, mild obesity  HEENT: Mucous membranes are moist.  No obvious abnormality noted.  Neck: Supple with no lymphadenopathy.  No JVD.  Carotid pulses are intact.  Bilateral radiated aortic murmur appreciated.    Cardiovascular system: Regular rhythm.  Normal S1 and S2.  3/6 harsh systolic ejection murmur at the base..  No rubs or gallops. Extremities: Mild bilateral lower extremity pitting edema. No cyanosis.  Pulmonary: Respirations unlabored.  Good air entry bilaterally.  Clear to auscultation bilaterally.  Gastrointestinal: Abdomen is soft and nontender.  Bowel sounds are positive.  Musculoskeletal: Upper Extremities: Normal upper motor strength. Lower Extremity: Normal motor strength. Gait: Normal.   Skin: Skin is warm. No rashes or lesions.  Neurological: Patient is alert  "and oriented with no gross motor deficits.  Psychiatric: Mood is normal.  Behavior is normal.    Vitals:    12/13/24 0756   BP: 146/78   Pulse: 64   Temp: 98.2 °F (36.8 °C)   SpO2: 98%      Body mass index is 32.69 kg/m².  Wt Readings from Last 3 Encounters:   12/13/24 109 kg (241 lb)   09/18/24 108 kg (238 lb)   07/23/24 108 kg (238 lb 3.2 oz)             Marciano Marte MD, FACC, FASE    Portions of the record  have been created with voice recognition software.  Occasional grammatical mistakes or wrong word or \"sound alike\" substitutions may have occurred due to the inherent limitations of voice recognition software. Please reach out to me directly for any clarifications.   "

## 2024-12-13 NOTE — ASSESSMENT & PLAN NOTE
His functional capacity is quite good objectively but he does have mild dyspnea on moderate to significant exertion.  We do not have any treadmill stress test on record to have an objective evidence of his functional capacity.  We will schedule him for baseline treadmill stress test as that will help us determine the timing for his aortic valve intervention as well.

## 2024-12-13 NOTE — ASSESSMENT & PLAN NOTE
Last carotid duplex in 2022 showed mild less than 50% bilateral carotid stenosis.  Patient is due for an updated carotid duplex study.  His most recent LDL from March 2024 was above target at 86 mg/dL.  Recheck lipids now and then we will adjust his statins.

## 2025-01-03 ENCOUNTER — HOSPITAL ENCOUNTER (OUTPATIENT)
Dept: NON INVASIVE DIAGNOSTICS | Facility: HOSPITAL | Age: 70
Discharge: HOME | End: 2025-01-03
Attending: INTERNAL MEDICINE
Payer: COMMERCIAL

## 2025-01-03 ENCOUNTER — APPOINTMENT (OUTPATIENT)
Dept: LAB | Facility: HOSPITAL | Age: 70
End: 2025-01-03
Payer: COMMERCIAL

## 2025-01-03 ENCOUNTER — PREP FOR PROCEDURE (OUTPATIENT)
Dept: CARDIOLOGY CLINIC | Facility: CLINIC | Age: 70
End: 2025-01-03

## 2025-01-03 DIAGNOSIS — R94.39 ABNORMAL CARDIOVASCULAR STRESS TEST: Primary | ICD-10-CM

## 2025-01-03 DIAGNOSIS — I65.23 BILATERAL CAROTID ARTERY STENOSIS: ICD-10-CM

## 2025-01-03 DIAGNOSIS — I10 ESSENTIAL HYPERTENSION: ICD-10-CM

## 2025-01-03 DIAGNOSIS — R06.09 DOE (DYSPNEA ON EXERTION): ICD-10-CM

## 2025-01-03 DIAGNOSIS — Q23.81 BICUSPID AORTIC VALVE: ICD-10-CM

## 2025-01-03 DIAGNOSIS — I35.0 MODERATE AORTIC VALVE STENOSIS: ICD-10-CM

## 2025-01-03 DIAGNOSIS — Q23.81 AORTIC STENOSIS DUE TO BICUSPID AORTIC VALVE: ICD-10-CM

## 2025-01-03 DIAGNOSIS — I35.1 MILD AORTIC VALVE REGURGITATION: ICD-10-CM

## 2025-01-03 DIAGNOSIS — Q23.0 AORTIC STENOSIS DUE TO BICUSPID AORTIC VALVE: ICD-10-CM

## 2025-01-03 LAB
CHOLEST SERPL-MCNC: 151 MG/DL (ref ?–200)
HDLC SERPL-MCNC: 40 MG/DL
LDLC SERPL CALC-MCNC: 87 MG/DL (ref 0–100)
MAX HR PERCENT: 90 %
MAX HR: 137 BPM
NONHDLC SERPL-MCNC: 111 MG/DL
RATE PRESSURE PRODUCT: NORMAL
SL CV STRESS RECOVERY BP: 78 MMHG
SL CV STRESS RECOVERY HR: 2 BPM
SL CV STRESS RECOVERY O2 SAT: 98 %
SL CV STRESS STAGE REACHED: 3
STRESS ANGINA INDEX: 0
STRESS BASELINE BP: NORMAL MMHG
STRESS BASELINE HR: 66 BPM
STRESS O2 SAT REST: 98 %
STRESS PEAK HR: 137 BPM
STRESS POST ESTIMATED WORKLOAD: 9.5 METS
STRESS POST EXERCISE DUR MIN: 6 MIN
STRESS POST EXERCISE DUR SEC: 50 SEC
STRESS POST O2 SAT PEAK: 99 %
STRESS POST PEAK BP: 184 MMHG
TRIGL SERPL-MCNC: 120 MG/DL (ref ?–150)

## 2025-01-03 PROCEDURE — 80061 LIPID PANEL: CPT

## 2025-01-03 PROCEDURE — 36415 COLL VENOUS BLD VENIPUNCTURE: CPT

## 2025-01-03 PROCEDURE — 93880 EXTRACRANIAL BILAT STUDY: CPT

## 2025-01-03 PROCEDURE — 93880 EXTRACRANIAL BILAT STUDY: CPT | Performed by: SURGERY

## 2025-01-03 PROCEDURE — 93018 CV STRESS TEST I&R ONLY: CPT | Performed by: INTERNAL MEDICINE

## 2025-01-03 PROCEDURE — 93016 CV STRESS TEST SUPVJ ONLY: CPT | Performed by: INTERNAL MEDICINE

## 2025-01-03 PROCEDURE — 93017 CV STRESS TEST TRACING ONLY: CPT

## 2025-01-06 LAB
ARRHY DURING EX: NORMAL
CHEST PAIN STATEMENT: NORMAL
MAX DIASTOLIC BP: 72 MMHG
MAX PREDICTED HEART RATE: 152 BPM
PROTOCOL NAME: NORMAL
REASON FOR TERMINATION: NORMAL
STRESS POST EXERCISE DUR MIN: 6 MIN
STRESS POST EXERCISE DUR SEC: 50 SEC
STRESS POST PEAK HR: 137 BPM
STRESS POST PEAK SYSTOLIC BP: 174 MMHG
TARGET HR FORMULA: NORMAL
TEST INDICATION: NORMAL

## 2025-01-07 ENCOUNTER — TELEPHONE (OUTPATIENT)
Dept: CARDIOLOGY CLINIC | Facility: CLINIC | Age: 70
End: 2025-01-07

## 2025-01-07 DIAGNOSIS — I10 ESSENTIAL HYPERTENSION: ICD-10-CM

## 2025-01-07 DIAGNOSIS — I35.0 MODERATE AORTIC VALVE STENOSIS: Primary | ICD-10-CM

## 2025-01-07 DIAGNOSIS — Q23.81 BICUSPID AORTIC VALVE: ICD-10-CM

## 2025-01-07 DIAGNOSIS — I35.1 MILD AORTIC VALVE REGURGITATION: ICD-10-CM

## 2025-01-07 NOTE — TELEPHONE ENCOUNTER
Patient scheduled for LEFT  Heart Cath on 1/22/25 at [facility] with Dr. COATES.      Instructions sent to patient through [employer].      Patient aware of all general instructions.    Medication holds:     irbesartan-hydrochlorothiazide (AVALIDE) - DO NOT take this medication the morning of the procedure.       Labs to be done NO LATER THAN 1/26/25  CMP / CBC (fasting 8 hours)

## 2025-01-15 ENCOUNTER — APPOINTMENT (OUTPATIENT)
Dept: LAB | Facility: HOSPITAL | Age: 70
End: 2025-01-15
Payer: COMMERCIAL

## 2025-01-15 DIAGNOSIS — I35.1 MILD AORTIC VALVE REGURGITATION: ICD-10-CM

## 2025-01-15 DIAGNOSIS — I35.0 MODERATE AORTIC VALVE STENOSIS: ICD-10-CM

## 2025-01-15 DIAGNOSIS — Q23.81 BICUSPID AORTIC VALVE: ICD-10-CM

## 2025-01-15 DIAGNOSIS — I10 ESSENTIAL HYPERTENSION: ICD-10-CM

## 2025-01-15 LAB
ALBUMIN SERPL BCG-MCNC: 4.3 G/DL (ref 3.5–5)
ALP SERPL-CCNC: 54 U/L (ref 34–104)
ALT SERPL W P-5'-P-CCNC: 17 U/L (ref 7–52)
ANION GAP SERPL CALCULATED.3IONS-SCNC: 4 MMOL/L (ref 4–13)
AST SERPL W P-5'-P-CCNC: 25 U/L (ref 13–39)
BASOPHILS # BLD AUTO: 0.04 THOUSANDS/ΜL (ref 0–0.1)
BASOPHILS NFR BLD AUTO: 1 % (ref 0–1)
BILIRUB SERPL-MCNC: 0.91 MG/DL (ref 0.2–1)
BUN SERPL-MCNC: 17 MG/DL (ref 5–25)
CALCIUM SERPL-MCNC: 9.4 MG/DL (ref 8.4–10.2)
CHLORIDE SERPL-SCNC: 102 MMOL/L (ref 96–108)
CO2 SERPL-SCNC: 34 MMOL/L (ref 21–32)
CREAT SERPL-MCNC: 0.93 MG/DL (ref 0.6–1.3)
EOSINOPHIL # BLD AUTO: 0.29 THOUSAND/ΜL (ref 0–0.61)
EOSINOPHIL NFR BLD AUTO: 5 % (ref 0–6)
ERYTHROCYTE [DISTWIDTH] IN BLOOD BY AUTOMATED COUNT: 12.6 % (ref 11.6–15.1)
GFR SERPL CREATININE-BSD FRML MDRD: 84 ML/MIN/1.73SQ M
GLUCOSE P FAST SERPL-MCNC: 90 MG/DL (ref 65–99)
HCT VFR BLD AUTO: 44.3 % (ref 36.5–49.3)
HGB BLD-MCNC: 15.5 G/DL (ref 12–17)
IMM GRANULOCYTES # BLD AUTO: 0.01 THOUSAND/UL (ref 0–0.2)
IMM GRANULOCYTES NFR BLD AUTO: 0 % (ref 0–2)
LYMPHOCYTES # BLD AUTO: 1.94 THOUSANDS/ΜL (ref 0.6–4.47)
LYMPHOCYTES NFR BLD AUTO: 36 % (ref 14–44)
MCH RBC QN AUTO: 29.4 PG (ref 26.8–34.3)
MCHC RBC AUTO-ENTMCNC: 35 G/DL (ref 31.4–37.4)
MCV RBC AUTO: 84 FL (ref 82–98)
MONOCYTES # BLD AUTO: 0.54 THOUSAND/ΜL (ref 0.17–1.22)
MONOCYTES NFR BLD AUTO: 10 % (ref 4–12)
NEUTROPHILS # BLD AUTO: 2.56 THOUSANDS/ΜL (ref 1.85–7.62)
NEUTS SEG NFR BLD AUTO: 48 % (ref 43–75)
NRBC BLD AUTO-RTO: 0 /100 WBCS
PLATELET # BLD AUTO: 192 THOUSANDS/UL (ref 149–390)
PMV BLD AUTO: 9.3 FL (ref 8.9–12.7)
POTASSIUM SERPL-SCNC: 3.7 MMOL/L (ref 3.5–5.3)
PROT SERPL-MCNC: 7.1 G/DL (ref 6.4–8.4)
RBC # BLD AUTO: 5.27 MILLION/UL (ref 3.88–5.62)
SODIUM SERPL-SCNC: 140 MMOL/L (ref 135–147)
WBC # BLD AUTO: 5.38 THOUSAND/UL (ref 4.31–10.16)

## 2025-01-15 PROCEDURE — 36415 COLL VENOUS BLD VENIPUNCTURE: CPT

## 2025-01-15 PROCEDURE — 85025 COMPLETE CBC W/AUTO DIFF WBC: CPT

## 2025-01-15 PROCEDURE — 80053 COMPREHEN METABOLIC PANEL: CPT

## 2025-01-21 NOTE — H&P
H&P - Cardiology   Lavon Carlos 69 y.o. male MRN: [id number]  Unit/Bed#:  Encounter: [id number]  01/21/25  3:18 PM      Assessment/ Plan:  HTN  2.   HLD  3.  Bicuspid aortic valve, moderate to severe AS, mild MR--TTE on 9/18/2024 revealed LVEF more than 70%, aortic valve area 0.99, DVI 0.37, mean gradient 31, peak velocity 3.6  4.  Bilateral carotid artery stenosis  5.  Dyspnea on exertion--patient complained of mild dyspnea on moderate to significant exertion.  No angina.  Exercise stress test on 1/3/2025 revealed ST depression in 2, 3, aVF, V5 and V6.  Patient is referred for left heart cath.    Patient seen and examined at bedside.    Brief overview of procedure discussed with patient.  Indication, risk, benefits and alternatives discussed with patient who verbalized understanding.  All questions addressed fully.  Patient elected to proceed with planned procedure, consent completed.    Patient remains clinically stable.  Renal function, coagulation profile, and hemoglobin all within normal limits.  Patient has no planned upcoming surgical procedures at this time.    We will proceed with planned procedure.        History of Present Illness     HPI: Lavon Carlos is a 69 y.o. year old male with a past Mextra of hypertension, hyperlipidemia, bilateral carotid artery stenosis, bicuspid aortic valve with moderate to severe AS and mild MR who presents to the Cath Lab for left heart cath.  Patient was recently seen by his cardiologist due to dyspnea on exertion.  Exercise ECG stress test was ordered and reviewed ST depression in inferior leads and V5 and V6 suggesting ischemia.  Patient was referred for left heart cath.  Currently patient denies chest pain, SOB. No hx of syncope.     Review of Systems   Constitutional:  Negative for chills and fever.   HENT:  Negative for congestion, rhinorrhea, sneezing and sore throat.    Eyes:  Negative for pain and discharge.   Respiratory:  Negative for cough, chest tightness,  shortness of breath and wheezing.    Cardiovascular:  Negative for chest pain and leg swelling.   Gastrointestinal:  Negative for abdominal pain, nausea and vomiting.   Endocrine: Negative for polydipsia, polyphagia and polyuria.   Genitourinary:  Negative for flank pain, frequency and urgency.   Musculoskeletal:  Negative for arthralgias, back pain and joint swelling.   Skin:  Negative for color change and pallor.   Neurological:  Negative for dizziness, weakness, light-headedness and headaches.   Psychiatric/Behavioral:  Negative for agitation and confusion.        Historical Information   Past Medical History:   Diagnosis Date    Bicuspid aortic valve     Carotid stenosis     Hypertension     Uncomplicated. Well-controlled overall.     Past Surgical History:   Procedure Laterality Date    VASECTOMY       Social History     Substance and Sexual Activity   Alcohol Use Not Currently     Social History     Substance and Sexual Activity   Drug Use Never     Social History     Tobacco Use   Smoking Status Never    Passive exposure: Never   Smokeless Tobacco Never       Family History:   Family History   Problem Relation Age of Onset    Hypertension Mother     Arthritis Mother     Stroke Mother         Age 80    Completed Suicide  Father     No Known Problems Sister     Hypertension Brother     Hypertension Brother     Prostate cancer Paternal Uncle        Meds/Allergies   all current active meds have been reviewed  No Known Allergies    Objective   Vitals: There were no vitals taken for this visit., There is no height or weight on file to calculate BMI.,     No data recorded.    No data recorded.      No intake or output data in the 24 hours ending 01/21/25 1518    Invasive Devices       None                     Physical Exam  Constitutional:       General: He is not in acute distress.     Appearance: He is well-developed. He is not diaphoretic.   HENT:      Head: Normocephalic.      Mouth/Throat:      Pharynx: No  oropharyngeal exudate.   Eyes:      Pupils: Pupils are equal, round, and reactive to light.   Cardiovascular:      Rate and Rhythm: Normal rate and regular rhythm.      Heart sounds: Murmur heard.   Pulmonary:      Effort: Pulmonary effort is normal. No respiratory distress.      Breath sounds: No wheezing or rales.   Abdominal:      General: Bowel sounds are normal.      Palpations: Abdomen is soft.      Tenderness: There is no abdominal tenderness.   Musculoskeletal:         General: No tenderness. Normal range of motion.      Cervical back: Normal range of motion.      Right lower leg: No edema.      Left lower leg: No edema.   Skin:     General: Skin is warm.   Neurological:      Mental Status: He is alert and oriented to person, place, and time.           Lab Results:       CBC with diff:   Results from last 7 days   Lab Units 01/15/25  0814   WBC Thousand/uL 5.38   HEMOGLOBIN g/dL 15.5   HEMATOCRIT % 44.3   MCV fL 84   PLATELETS Thousands/uL 192   RBC Million/uL 5.27   MCH pg 29.4   MCHC g/dL 35.0   RDW % 12.6   MPV fL 9.3   NRBC AUTO /100 WBCs 0         CMP:   Results from last 7 days   Lab Units 01/15/25  0814   POTASSIUM mmol/L 3.7   CHLORIDE mmol/L 102   CO2 mmol/L 34*   BUN mg/dL 17   CREATININE mg/dL 0.93   CALCIUM mg/dL 9.4   AST U/L 25   ALT U/L 17   ALK PHOS U/L 54   EGFR ml/min/1.73sq m 84       EKG:  normal sinus rhythm, Nonspecific T wave abnormality        Previous STRESS TEST:  Results for orders placed during the hospital encounter of 01/03/25    Stress test only, exercise    Interpretation Summary    Stress test done to assess functional capacity in view of moderate aortic stenosis.    Stress ECG: Patient developed 0.5 to 1 mm ST depression at stage I at 2 minutes and 50 seconds, in inferolateral leads which progressively worsened to 2 mm of downsloping ST depression in leads II, III, aVF and V4 V5 V6 at 6 minutes and 36 seconds.  ST depression persisted nearly 5 minutes into recovery.  Arrhythmias during stress: occasional PACs, occasional PVCs. Arrhythmias during recovery: SVT. The stress ECG is consistent with ischemia after maximal exercise.  Patient complained of dyspnea on exertion and fatigue but no chest pain.    Strongly positive treadmill stress test at low work threshold.  Will proceed with further investigation.    No comparison study available.           ECHO:  No results found for this or any previous visit.    Results for orders placed during the hospital encounter of 09/18/24    Echo complete w/ contrast if indicated    Interpretation Summary    Left Ventricle: Left ventricular cavity size is normal. There is mild concentric hypertrophy. The left ventricular ejection fraction is >70 %. Systolic function is hyperdynamic. Wall motion is normal. Diastolic function is normal.    Aortic Valve: The aortic valve is congenitally bicuspid with commissural fusion of the right-left coronary cusp. The leaflets are moderately calcified. There is mild regurgitation. There is moderate stenosis.    Mitral Valve: There is mild regurgitation.    Aorta: The aortic root is mildly dilated. Ao root is 4.00 cm.

## 2025-01-22 ENCOUNTER — HOSPITAL ENCOUNTER (OUTPATIENT)
Facility: HOSPITAL | Age: 70
Setting detail: OUTPATIENT SURGERY
Discharge: HOME | End: 2025-01-22
Attending: INTERNAL MEDICINE | Admitting: INTERNAL MEDICINE
Payer: COMMERCIAL

## 2025-01-22 VITALS
DIASTOLIC BLOOD PRESSURE: 74 MMHG | SYSTOLIC BLOOD PRESSURE: 150 MMHG | BODY MASS INDEX: 32.1 KG/M2 | HEART RATE: 65 BPM | HEIGHT: 72 IN | OXYGEN SATURATION: 95 % | TEMPERATURE: 97.7 F | RESPIRATION RATE: 18 BRPM | WEIGHT: 237 LBS

## 2025-01-22 DIAGNOSIS — R94.39 ABNORMAL CARDIOVASCULAR STRESS TEST: ICD-10-CM

## 2025-01-22 DIAGNOSIS — Q23.0 AORTIC STENOSIS DUE TO BICUSPID AORTIC VALVE: ICD-10-CM

## 2025-01-22 DIAGNOSIS — E78.2 MIXED HYPERLIPIDEMIA: ICD-10-CM

## 2025-01-22 DIAGNOSIS — R06.09 DOE (DYSPNEA ON EXERTION): ICD-10-CM

## 2025-01-22 DIAGNOSIS — Q23.81 AORTIC STENOSIS DUE TO BICUSPID AORTIC VALVE: ICD-10-CM

## 2025-01-22 PROBLEM — Z98.890 S/P CARDIAC CATH: Status: ACTIVE | Noted: 2025-01-22

## 2025-01-22 LAB
ATRIAL RATE: 67 BPM
P AXIS: 47 DEGREES
PR INTERVAL: 192 MS
QRS AXIS: 76 DEGREES
QRSD INTERVAL: 88 MS
QT INTERVAL: 416 MS
QTC INTERVAL: 440 MS
T WAVE AXIS: 80 DEGREES
VENTRICULAR RATE: 67 BPM

## 2025-01-22 PROCEDURE — 93799 UNLISTED CV SVC/PROCEDURE: CPT | Performed by: INTERNAL MEDICINE

## 2025-01-22 PROCEDURE — C1769 GUIDE WIRE: HCPCS | Performed by: INTERNAL MEDICINE

## 2025-01-22 PROCEDURE — 93010 ELECTROCARDIOGRAM REPORT: CPT | Performed by: INTERNAL MEDICINE

## 2025-01-22 PROCEDURE — NC001 PR NO CHARGE: Performed by: INTERNAL MEDICINE

## 2025-01-22 PROCEDURE — 99152 MOD SED SAME PHYS/QHP 5/>YRS: CPT | Performed by: INTERNAL MEDICINE

## 2025-01-22 PROCEDURE — 93458 L HRT ARTERY/VENTRICLE ANGIO: CPT | Performed by: INTERNAL MEDICINE

## 2025-01-22 PROCEDURE — C1894 INTRO/SHEATH, NON-LASER: HCPCS | Performed by: INTERNAL MEDICINE

## 2025-01-22 PROCEDURE — 99153 MOD SED SAME PHYS/QHP EA: CPT | Performed by: INTERNAL MEDICINE

## 2025-01-22 PROCEDURE — C1887 CATHETER, GUIDING: HCPCS | Performed by: INTERNAL MEDICINE

## 2025-01-22 PROCEDURE — 93571 IV DOP VEL&/PRESS C FLO 1ST: CPT | Performed by: INTERNAL MEDICINE

## 2025-01-22 PROCEDURE — 93005 ELECTROCARDIOGRAM TRACING: CPT

## 2025-01-22 RX ORDER — ASPIRIN 81 MG/1
324 TABLET, CHEWABLE ORAL ONCE
Status: COMPLETED | OUTPATIENT
Start: 2025-01-22 | End: 2025-01-22

## 2025-01-22 RX ORDER — FENTANYL CITRATE 50 UG/ML
INJECTION, SOLUTION INTRAMUSCULAR; INTRAVENOUS CODE/TRAUMA/SEDATION MEDICATION
Status: DISCONTINUED | OUTPATIENT
Start: 2025-01-22 | End: 2025-01-22 | Stop reason: HOSPADM

## 2025-01-22 RX ORDER — ROSUVASTATIN CALCIUM 5 MG/1
20 TABLET, COATED ORAL DAILY
Qty: 30 TABLET | Refills: 2 | Status: SHIPPED | OUTPATIENT
Start: 2025-01-22

## 2025-01-22 RX ORDER — SODIUM CHLORIDE 9 MG/ML
125 INJECTION, SOLUTION INTRAVENOUS CONTINUOUS
Status: DISCONTINUED | OUTPATIENT
Start: 2025-01-22 | End: 2025-01-22 | Stop reason: HOSPADM

## 2025-01-22 RX ORDER — VERAPAMIL HYDROCHLORIDE 2.5 MG/ML
INJECTION, SOLUTION INTRAVENOUS CODE/TRAUMA/SEDATION MEDICATION
Status: DISCONTINUED | OUTPATIENT
Start: 2025-01-22 | End: 2025-01-22 | Stop reason: HOSPADM

## 2025-01-22 RX ORDER — HEPARIN SODIUM 1000 [USP'U]/ML
INJECTION, SOLUTION INTRAVENOUS; SUBCUTANEOUS CODE/TRAUMA/SEDATION MEDICATION
Status: DISCONTINUED | OUTPATIENT
Start: 2025-01-22 | End: 2025-01-22 | Stop reason: HOSPADM

## 2025-01-22 RX ORDER — LIDOCAINE HYDROCHLORIDE 10 MG/ML
INJECTION, SOLUTION EPIDURAL; INFILTRATION; INTRACAUDAL; PERINEURAL CODE/TRAUMA/SEDATION MEDICATION
Status: DISCONTINUED | OUTPATIENT
Start: 2025-01-22 | End: 2025-01-22 | Stop reason: HOSPADM

## 2025-01-22 RX ORDER — NITROGLYCERIN 20 MG/100ML
INJECTION INTRAVENOUS CODE/TRAUMA/SEDATION MEDICATION
Status: DISCONTINUED | OUTPATIENT
Start: 2025-01-22 | End: 2025-01-22 | Stop reason: HOSPADM

## 2025-01-22 RX ORDER — BUTYROSPERMUM PARKII(SHEA BUTTER), SIMMONDSIA CHINENSIS (JOJOBA) SEED OIL, ALOE BARBADENSIS LEAF EXTRACT .01; 1; 3.5 G/100G; G/100G; G/100G
10000 LIQUID TOPICAL DAILY
COMMUNITY

## 2025-01-22 RX ORDER — MIDAZOLAM HYDROCHLORIDE 2 MG/2ML
INJECTION, SOLUTION INTRAMUSCULAR; INTRAVENOUS CODE/TRAUMA/SEDATION MEDICATION
Status: DISCONTINUED | OUTPATIENT
Start: 2025-01-22 | End: 2025-01-22 | Stop reason: HOSPADM

## 2025-01-22 RX ADMIN — SODIUM CHLORIDE 125 ML/HR: 0.9 INJECTION, SOLUTION INTRAVENOUS at 07:12

## 2025-01-22 RX ADMIN — ASPIRIN 81 MG CHEWABLE TABLET 243 MG: 81 TABLET CHEWABLE at 07:12

## 2025-01-28 ENCOUNTER — OFFICE VISIT (OUTPATIENT)
Dept: FAMILY MEDICINE CLINIC | Facility: CLINIC | Age: 70
End: 2025-01-28
Payer: COMMERCIAL

## 2025-01-28 VITALS
HEART RATE: 70 BPM | DIASTOLIC BLOOD PRESSURE: 60 MMHG | WEIGHT: 241.6 LBS | SYSTOLIC BLOOD PRESSURE: 146 MMHG | OXYGEN SATURATION: 98 % | BODY MASS INDEX: 32.72 KG/M2 | HEIGHT: 72 IN

## 2025-01-28 DIAGNOSIS — I10 ESSENTIAL HYPERTENSION: Primary | ICD-10-CM

## 2025-01-28 DIAGNOSIS — E66.09 CLASS 1 OBESITY DUE TO EXCESS CALORIES WITHOUT SERIOUS COMORBIDITY WITH BODY MASS INDEX (BMI) OF 32.0 TO 32.9 IN ADULT: ICD-10-CM

## 2025-01-28 DIAGNOSIS — I25.10 CORONARY ARTERY DISEASE, NON-OCCLUSIVE: ICD-10-CM

## 2025-01-28 DIAGNOSIS — E66.811 CLASS 1 OBESITY DUE TO EXCESS CALORIES WITHOUT SERIOUS COMORBIDITY WITH BODY MASS INDEX (BMI) OF 32.0 TO 32.9 IN ADULT: ICD-10-CM

## 2025-01-28 DIAGNOSIS — Q23.81 BICUSPID AORTIC VALVE: ICD-10-CM

## 2025-01-28 DIAGNOSIS — E78.2 MIXED HYPERLIPIDEMIA: ICD-10-CM

## 2025-01-28 PROCEDURE — 99214 OFFICE O/P EST MOD 30 MIN: CPT | Performed by: INTERNAL MEDICINE

## 2025-01-28 PROCEDURE — G2211 COMPLEX E/M VISIT ADD ON: HCPCS | Performed by: INTERNAL MEDICINE

## 2025-01-28 NOTE — ASSESSMENT & PLAN NOTE
He is only taking 10 mg daily of the rosuvastatin although he was instructed to take 20 mg (four 5 mg tablets.  I mentioned that his cardiologist is probably going to want to get his LDL under 70 given that he has nonobstructive coronary disease.

## 2025-01-28 NOTE — PROGRESS NOTES
Name: Lavon Carlos      : 1955      MRN: [id number]  Encounter Provider: Steffen Arnett MD  Encounter Date: 2025   Encounter department: [facility] PRIMARY [facility]ARE    Assessment & Plan  Essential hypertension  His blood pressure was recorded at 146/60 mmHg during the visit. He is currently on irbesartan hydrochlorothiazide 300-12.5 mg once daily and amlodipine 10 mg once daily. He is advised to monitor his blood pressure at home with a target of 130/80 mmHg. Weight loss is recommended to help lower his blood pressure. If his blood pressure remains elevated, increasing the hydrochlorothiazide component to 25 mg or adding another antihypertensive agent may be considered.       Bicuspid aortic valve  He is asymptomatic and works out at a pretty high workload.  Continue clinical follow-up and follow-up with cardiology.       Mixed hyperlipidemia  He is only taking 10 mg daily of the rosuvastatin although he was instructed to take 20 mg (four 5 mg tablets.  I mentioned that his cardiologist is probably going to want to get his LDL under 70 given that he has nonobstructive coronary disease.       Class 1 obesity due to excess calories without serious comorbidity with body mass index (BMI) of 32.0 to 32.9 in adult  Encouraged him to work on weight loss.    Orders:    US elastography/UGAP; Future    Coronary artery disease, non-occlusive  The patient has nonobstructive epicardial disease with a 30% proximal RCA lesion, 50% mid circumflex lesion, 30% mid LAD lesion, and a 40% distal LAD lesion. He underwent a cardiac catheterization on the , which confirmed these findings. He is currently taking rosuvastatin 10 mg daily and baby aspirin. His total cholesterol was 151 mg/dL, and LDL cholesterol was 87 mg/dL as of 2025. He is advised to continue his current medication regimen and follow up with Dr. Marte on 2025.       Elevated risk of liver fibrosis.  His FIB-4 score is 2.15,  indicating a mid-level risk for liver fibrosis. An elastography test will be ordered to assess liver stiffness. The test is a painless ultrasound that measures the stiffness of the liver.       History of Present Illness     History of Present Illness  The patient is a 70-year-old male who presents for evaluation of coronary artery disease, hypertension, and elevated cholesterol.    He underwent cardiac catheterization on the 22nd, revealing medical grade coronary blockages of 50% in one artery and 30% in two others, which did not necessitate angioplasty or further intervention. This procedure was prompted by an abnormal stress test. He has a scheduled follow-up with Dr. Marte on the 17th. He reports no chest pain, pressure, or shortness of breath. His respiratory function remains normal during sleep. He has been mindful of his heart rate during gym workouts, ensuring it does not exceed 120 during weight training, although it may rise slightly during interval runs.    His current medication regimen includes rosuvastatin 10 mg daily (two 5 mg tablets) and baby aspirin. He anticipates a discussion regarding his blood work results with Dr. Marte. He has not been monitoring his blood pressure at home, but Dr. Marte has expressed interest in him doing so. His blood pressure medication was adjusted over the past year, and he is curious about Dr. Marte's perspective on this change. He recalls a recommendation from Dr. Marte in mid-December to increase his irbesartan hydrochlorothiazide dosage to 300 mg with 25 mg of diuretic, but this was not implemented due to his existing medication supply at home.         Review of Systems  Objective   /76 (BP Location: Left arm, Patient Position: Sitting, Cuff Size: Large)   Pulse 70   Ht 6' (1.829 m)   Wt 110 kg (241 lb 9.6 oz)   SpO2 98%   BMI 32.77 kg/m²     Physical Exam    Physical Exam  Constitutional:       General: He is not in acute distress.     Appearance: Normal  appearance. He is well-developed. He is not ill-appearing.   Neck:      Vascular: No JVD.   Cardiovascular:      Rate and Rhythm: Normal rate and regular rhythm.      Heart sounds: Murmur heard.      Crescendo decrescendo systolic murmur is present with a grade of 4/6.      No friction rub. No gallop.   Pulmonary:      Breath sounds: Normal breath sounds.   Abdominal:      General: Bowel sounds are normal. There is no distension.      Palpations: Abdomen is soft. There is no mass.   Musculoskeletal:         General: No swelling.   Neurological:      Mental Status: He is alert.

## 2025-01-28 NOTE — ASSESSMENT & PLAN NOTE
His blood pressure was recorded at 146/60 mmHg during the visit. He is currently on irbesartan hydrochlorothiazide 300-12.5 mg once daily and amlodipine 10 mg once daily. He is advised to monitor his blood pressure at home with a target of 130/80 mmHg. Weight loss is recommended to help lower his blood pressure. If his blood pressure remains elevated, increasing the hydrochlorothiazide component to 25 mg or adding another antihypertensive agent may be considered.

## 2025-01-28 NOTE — ASSESSMENT & PLAN NOTE
He is asymptomatic and works out at a pretty high workload.  Continue clinical follow-up and follow-up with cardiology.

## 2025-01-28 NOTE — ASSESSMENT & PLAN NOTE
The patient has nonobstructive epicardial disease with a 30% proximal RCA lesion, 50% mid circumflex lesion, 30% mid LAD lesion, and a 40% distal LAD lesion. He underwent a cardiac catheterization on the 22nd, which confirmed these findings. He is currently taking rosuvastatin 10 mg daily and baby aspirin. His total cholesterol was 151 mg/dL, and LDL cholesterol was 87 mg/dL as of 01/03/2025. He is advised to continue his current medication regimen and follow up with Dr. Marte on 02/17/2025.

## 2025-02-03 ENCOUNTER — HOSPITAL ENCOUNTER (OUTPATIENT)
Dept: ULTRASOUND IMAGING | Facility: HOSPITAL | Age: 70
Discharge: HOME | End: 2025-02-03
Attending: INTERNAL MEDICINE
Payer: COMMERCIAL

## 2025-02-03 DIAGNOSIS — E66.09 CLASS 1 OBESITY DUE TO EXCESS CALORIES WITHOUT SERIOUS COMORBIDITY WITH BODY MASS INDEX (BMI) OF 32.0 TO 32.9 IN ADULT: ICD-10-CM

## 2025-02-03 DIAGNOSIS — E66.811 CLASS 1 OBESITY DUE TO EXCESS CALORIES WITHOUT SERIOUS COMORBIDITY WITH BODY MASS INDEX (BMI) OF 32.0 TO 32.9 IN ADULT: ICD-10-CM

## 2025-02-03 PROCEDURE — 76981 USE PARENCHYMA: CPT

## 2025-02-10 ENCOUNTER — RESULTS FOLLOW-UP (OUTPATIENT)
Dept: FAMILY MEDICINE CLINIC | Facility: CLINIC | Age: 70
End: 2025-02-10

## 2025-02-10 DIAGNOSIS — Z11.59 SCREENING FOR VIRAL DISEASE: ICD-10-CM

## 2025-02-10 DIAGNOSIS — K74.00 FIBROSIS OF LIVER: Primary | ICD-10-CM

## 2025-02-10 NOTE — TELEPHONE ENCOUNTER
Called Wade to discuss findings on his elastography which suggests severe fibrosis. He does not drink alcohol.     Will order liver ultrasound and PT PTT.  Also ordered referral to hepatology.

## 2025-02-11 ENCOUNTER — APPOINTMENT (OUTPATIENT)
Dept: LAB | Facility: HOSPITAL | Age: 70
End: 2025-02-11
Payer: COMMERCIAL

## 2025-02-11 ENCOUNTER — HOSPITAL ENCOUNTER (OUTPATIENT)
Dept: ULTRASOUND IMAGING | Facility: HOSPITAL | Age: 70
Discharge: HOME | End: 2025-02-11
Attending: INTERNAL MEDICINE
Payer: COMMERCIAL

## 2025-02-11 DIAGNOSIS — Z11.59 SCREENING FOR VIRAL DISEASE: ICD-10-CM

## 2025-02-11 DIAGNOSIS — K74.00 FIBROSIS OF LIVER: ICD-10-CM

## 2025-02-11 LAB
APTT PPP: 29 SECONDS (ref 23–34)
HAV AB SER QL IA: NORMAL
HAV IGM SER QL: NORMAL
HBV CORE IGM SER QL: NORMAL
HBV SURFACE AB SER-ACNC: <3 MIU/ML
HBV SURFACE AG SER QL: NORMAL
HCV AB SER QL: NORMAL
INR PPP: 1.08 (ref 0.85–1.19)
PROTHROMBIN TIME: 14.5 SECONDS (ref 12.3–15)

## 2025-02-11 PROCEDURE — 86708 HEPATITIS A ANTIBODY: CPT

## 2025-02-11 PROCEDURE — 85730 THROMBOPLASTIN TIME PARTIAL: CPT

## 2025-02-11 PROCEDURE — 36415 COLL VENOUS BLD VENIPUNCTURE: CPT

## 2025-02-11 PROCEDURE — 85610 PROTHROMBIN TIME: CPT

## 2025-02-11 PROCEDURE — 76705 ECHO EXAM OF ABDOMEN: CPT

## 2025-02-11 PROCEDURE — 80074 ACUTE HEPATITIS PANEL: CPT

## 2025-02-11 PROCEDURE — 86706 HEP B SURFACE ANTIBODY: CPT

## 2025-02-12 ENCOUNTER — RESULTS FOLLOW-UP (OUTPATIENT)
Dept: FAMILY MEDICINE CLINIC | Facility: CLINIC | Age: 70
End: 2025-02-12

## 2025-02-14 ENCOUNTER — RESULTS FOLLOW-UP (OUTPATIENT)
Dept: FAMILY MEDICINE CLINIC | Facility: CLINIC | Age: 70
End: 2025-02-14

## 2025-02-17 ENCOUNTER — OFFICE VISIT (OUTPATIENT)
Dept: CARDIOLOGY CLINIC | Facility: CLINIC | Age: 70
End: 2025-02-17
Payer: COMMERCIAL

## 2025-02-17 VITALS
HEIGHT: 72 IN | BODY MASS INDEX: 33.02 KG/M2 | SYSTOLIC BLOOD PRESSURE: 160 MMHG | TEMPERATURE: 97.2 F | HEART RATE: 67 BPM | OXYGEN SATURATION: 99 % | DIASTOLIC BLOOD PRESSURE: 78 MMHG | WEIGHT: 243.8 LBS

## 2025-02-17 DIAGNOSIS — I65.23 BILATERAL CAROTID ARTERY STENOSIS: ICD-10-CM

## 2025-02-17 DIAGNOSIS — I25.10 CORONARY ARTERY DISEASE, NON-OCCLUSIVE: ICD-10-CM

## 2025-02-17 DIAGNOSIS — Q23.81 BICUSPID AORTIC VALVE: ICD-10-CM

## 2025-02-17 DIAGNOSIS — E78.2 MIXED HYPERLIPIDEMIA: ICD-10-CM

## 2025-02-17 DIAGNOSIS — I10 ESSENTIAL HYPERTENSION: ICD-10-CM

## 2025-02-17 DIAGNOSIS — I35.0 MODERATE AORTIC VALVE STENOSIS: Primary | ICD-10-CM

## 2025-02-17 PROCEDURE — 99214 OFFICE O/P EST MOD 30 MIN: CPT | Performed by: INTERNAL MEDICINE

## 2025-02-17 PROCEDURE — G2211 COMPLEX E/M VISIT ADD ON: HCPCS | Performed by: INTERNAL MEDICINE

## 2025-02-17 RX ORDER — IRBESARTAN AND HYDROCHLOROTHIAZIDE 300; 12.5 MG/1; MG/1
1 TABLET, FILM COATED ORAL DAILY
Qty: 90 TABLET | Refills: 3 | Status: SHIPPED | OUTPATIENT
Start: 2025-02-17

## 2025-02-17 RX ORDER — ROSUVASTATIN CALCIUM 10 MG/1
10 TABLET, COATED ORAL DAILY
Qty: 90 TABLET | Refills: 3 | Status: SHIPPED | OUTPATIENT
Start: 2025-02-17

## 2025-02-17 RX ORDER — SPIRONOLACTONE 25 MG/1
25 TABLET ORAL EVERY OTHER DAY
Qty: 45 TABLET | Refills: 3 | Status: SHIPPED | OUTPATIENT
Start: 2025-02-17

## 2025-02-17 NOTE — PROGRESS NOTES
[facility] [place]  [address]  [address]  [facility] [address] [phone]    Patient Name: Lavon Carlos  YOB: 1955 ;male  MR No: [id number]        Diagnosis ICD-10-CM Associated Orders   1. Moderate aortic valve stenosis  I35.0       2. Bicuspid aortic valve  Q23.81       3. Coronary artery disease, non-occlusive  I25.10 Lipid panel      4. Essential hypertension  I10 irbesartan-hydrochlorothiazide (AVALIDE) 300-12.5 MG per tablet     spironolactone (ALDACTONE) 25 mg tablet      5. Mixed hyperlipidemia  E78.2 rosuvastatin (CRESTOR) 10 MG tablet      6. Bilateral carotid artery stenosis  I65.23            Assessment and recommendations:    1. Moderate aortic valve stenosis  2. Bicuspid aortic valve  3. Coronary artery disease, non-occlusive  -     Lipid panel; Future; Expected date: 04/01/2025  4. Essential hypertension  -     irbesartan-hydrochlorothiazide (AVALIDE) 300-12.5 MG per tablet; Take 1 tablet by mouth daily  -     spironolactone (ALDACTONE) 25 mg tablet; Take 1 tablet (25 mg total) by mouth every other day  5. Mixed hyperlipidemia  -     rosuvastatin (CRESTOR) 10 MG tablet; Take 1 tablet (10 mg total) by mouth daily  6. Bilateral carotid artery stenosis     Most recent echocardiogram from September 2024 showed moderate aortic stenosis with mild concentric left ventricular hypertrophy and excellent left ventricular systolic function as well as mild mitral regurgitation and mildly enlarged aortic root.  We will continue to follow with an echocardiogram 6 to 12 months.  Recent left heart catheterization in January 2025 showed 30% proximal RCA, 50% mid circumflex, 30% mid LAD and 40% distal LAD stenosis.  No significant obstructive disease was identified.  Mean aortic valve gradient was 20 mmHg.  Continued medical therapy and surveillance was advised.  Blood pressure from home shows systolics in the still 140s range.  He is  already on max dose of ARB and amlodipine.  We will add alternate day spironolactone 25 mg daily.  AV rosalina blockers cannot be added due to baseline slow heart rate.  His lipid profile showed LDL of 80 mg/dL and we had increased his rosuvastatin from 5 to 10 mg daily.  We will recheck his lipid profile and adjust the dose accordingly.  Most recent carotid duplex from January 2025 showed mild less than 50% bilateral carotid plaque.  Will continue to monitor and aim for LDL less than 70 mg/dL.  Return for follow-up in 3 months.    CHIEF COMPLAINT:      Aortic stenosis, hypertension, coronary artery disease    HPI:   69-year-old male with past medical history significant for bicuspid arctic valve, moderate aortic stenosis, mild aortic regurgitation, mildly enlarged aortic root, hypertension, presents for routine follow-up visit after recent cardiac stress testing and subsequent left heart catheterization.  Patient underwent treadmill stress test in January 2025 where he developed significant ST segment depression at 3 minutes into stress.  Patient did not complain of chest pain but did complain of dyspnea on exertion.  Cardiac catheterization showed mild triple-vessel CAD.    Patient remains very active and does regular exercises and denies any exertional chest pain, palpitation or syncope.  He only admits to mild dyspnea on moderate exertion.       Past Medical History:   Diagnosis Date    Bicuspid aortic valve     Carotid stenosis     Hypertension     Uncomplicated. Well-controlled overall.          CURRENT  MEDICATIONS:      Current Outpatient Medications:     amLODIPine (NORVASC) 10 mg tablet, TAKE 1 TABLET DAILY, Disp: 90 tablet, Rfl: 1    Arginine 1000 MG TABS, Take 1,000 mg by mouth in the morning, Disp: , Rfl:     Ascorbic Acid (Vitamin C) 500 MG CHEW, , Disp: , Rfl:     aspirin (ECOTRIN LOW STRENGTH) 81 mg EC tablet, Take by mouth, Disp: , Rfl:     glucosamine-chondroitin 500-400 MG tablet, Take 1 tablet by  mouth in the morning , Disp: , Rfl:     irbesartan-hydrochlorothiazide (AVALIDE) 300-12.5 MG per tablet, Take 1 tablet by mouth daily, Disp: 90 tablet, Rfl: 3    Krill Oil 500 MG CAPS, , Disp: , Rfl:     Magnesium Citrate 100 MG CAPS, Take 10,000 mcg by mouth daily, Disp: , Rfl:     Misc Natural Products (BEET ROOT PO), Take 3 tablets by mouth if needed (Super Beets heart chews before workout), Disp: , Rfl:     Multiple Vitamins-Minerals (PRESERVISION AREDS 2+MULTI VIT PO), Take 1 tablet by mouth Daily at 2am, Disp: , Rfl:     rosuvastatin (CRESTOR) 10 MG tablet, Take 1 tablet (10 mg total) by mouth daily, Disp: 90 tablet, Rfl: 3    spironolactone (ALDACTONE) 25 mg tablet, Take 1 tablet (25 mg total) by mouth every other day, Disp: 45 tablet, Rfl: 3    VITAMIN D PO, Take 10 mcg by mouth daily before dinner, Disp: , Rfl:     ALLERGIES  No Known Allergies    Lab Results   Component Value Date    LDLCALC 87 01/03/2025    LDLCALC 86 03/13/2024    HDL 40 01/03/2025    HDL 43 03/13/2024    CHOLESTEROL 151 01/03/2025    CHOLESTEROL 145 03/13/2024    TRIG 120 01/03/2025    TRIG 78 03/13/2024    CREATININE 0.93 01/15/2025    CREATININE 0.91 03/13/2024    K 3.7 01/15/2025    K 3.8 03/13/2024    SODIUM 140 01/15/2025    SODIUM 138 03/13/2024         REVIEW OF SYSTEMS   Positive for: Dyspnea only on moderate exertion.  Negative for: All remaining as reviewed below and in HPI.    SYSTEM SYMPTOMS REVIEWED:  General--weight change, fever, night sweats  Respiratory--cough, wheezing, shortness of breath, sputum production  Cardiovascular--chest pain, syncope, dyspnea on exertion, edema, decline in exercise tolerance, claudication   Gastrointestinal--persistent vomiting, diarrhea, abdominal distention, blood in stool   Muscular or skeletal--joint pain or swelling   Neurologic--headaches, syncope, abnormal movement  Hematologic--history of easy bruising and bleeding   Endocrine--thyroid enlargement, heat or cold intolerance, polyuria  "  Psychiatric--anxiety, depression     General physical examination:    General appearance: Alert, no acute distress, appears stated age, mild obesity  HEENT: Mucous membranes are moist.  No obvious abnormality noted.  Neck: Supple with no lymphadenopathy.  No JVD.  Carotid pulses are intact.  Bilateral radiated aortic murmur.    Cardiovascular system: Regular rhythm.  Normal S1 and S2.  3/6 harsh systolic ejection murmur at the base..  No rubs or gallops. Extremities: Mild bilateral lower extremity pitting edema. No cyanosis.  Pulmonary: Respirations unlabored.  Good air entry bilaterally.  Clear to auscultation bilaterally.  Gastrointestinal: Abdomen is soft and nontender.  Bowel sounds are positive.  Musculoskeletal: Upper Extremities: Normal upper motor strength. Lower Extremity: Normal motor strength. Gait: Normal.   Skin: Skin is warm. No rashes or lesions.  Neurological: Patient is alert and oriented with no gross motor deficits.  Psychiatric: Mood is normal.  Behavior is normal.    Vitals:    02/17/25 1256   BP: 160/78   Pulse: 67   Temp: (!) 97.2 °F (36.2 °C)   SpO2: 99%      Body mass index is 33.07 kg/m².  Wt Readings from Last 3 Encounters:   02/17/25 111 kg (243 lb 12.8 oz)   01/28/25 110 kg (241 lb 9.6 oz)   01/22/25 108 kg (237 lb)             Marciano Marte MD, FACC, DUYEN    Portions of the record  have been created with voice recognition software.  Occasional grammatical mistakes or wrong word or \"sound alike\" substitutions may have occurred due to the inherent limitations of voice recognition software. Please reach out to me directly for any clarifications.   "

## 2025-04-03 ENCOUNTER — RESULTS FOLLOW-UP (OUTPATIENT)
Dept: CARDIOLOGY CLINIC | Facility: CLINIC | Age: 70
End: 2025-04-03

## 2025-04-03 ENCOUNTER — APPOINTMENT (OUTPATIENT)
Dept: LAB | Facility: HOSPITAL | Age: 70
End: 2025-04-03
Payer: COMMERCIAL

## 2025-04-03 DIAGNOSIS — E78.2 MIXED HYPERLIPIDEMIA: Primary | ICD-10-CM

## 2025-04-03 DIAGNOSIS — I25.10 CORONARY ARTERY DISEASE, NON-OCCLUSIVE: ICD-10-CM

## 2025-04-03 LAB
CHOLEST SERPL-MCNC: 155 MG/DL (ref ?–200)
HDLC SERPL-MCNC: 39 MG/DL
LDLC SERPL CALC-MCNC: 92 MG/DL (ref 0–100)
NONHDLC SERPL-MCNC: 116 MG/DL
TRIGL SERPL-MCNC: 120 MG/DL (ref ?–150)

## 2025-04-03 PROCEDURE — 80061 LIPID PANEL: CPT

## 2025-04-03 PROCEDURE — 36415 COLL VENOUS BLD VENIPUNCTURE: CPT

## 2025-04-03 RX ORDER — ROSUVASTATIN CALCIUM 20 MG/1
20 TABLET, COATED ORAL DAILY
Qty: 90 TABLET | Refills: 3 | Status: SHIPPED | OUTPATIENT
Start: 2025-04-03

## 2025-04-21 DIAGNOSIS — E78.2 MIXED HYPERLIPIDEMIA: ICD-10-CM

## 2025-04-21 RX ORDER — ROSUVASTATIN CALCIUM 20 MG/1
20 TABLET, COATED ORAL DAILY
Qty: 90 TABLET | Refills: 3 | Status: SHIPPED | OUTPATIENT
Start: 2025-04-21

## 2025-05-09 ENCOUNTER — OFFICE VISIT (OUTPATIENT)
Age: 70
End: 2025-05-09

## 2025-05-09 VITALS
HEART RATE: 74 BPM | WEIGHT: 241.4 LBS | HEIGHT: 72 IN | SYSTOLIC BLOOD PRESSURE: 128 MMHG | BODY MASS INDEX: 32.7 KG/M2 | DIASTOLIC BLOOD PRESSURE: 76 MMHG

## 2025-05-09 DIAGNOSIS — K82.4 GALLBLADDER POLYP: ICD-10-CM

## 2025-05-09 DIAGNOSIS — K76.0 METABOLIC DYSFUNCTION-ASSOCIATED STEATOTIC LIVER DISEASE (MASLD): Primary | ICD-10-CM

## 2025-05-09 DIAGNOSIS — K74.00 HEPATIC FIBROSIS: ICD-10-CM

## 2025-05-09 NOTE — PROGRESS NOTES
Name: Lavon Carlos      : 1955      MRN: [id number]  Encounter Provider: Enma Grant MD  Encounter Date: 2025   Encounter department: [facility] GASTR[address]ENTEROLOGY [address]  :  Assessment & Plan  Hepatic fibrosis  In summary, this is a 70-year-old male with history of HTN, aortic valve stenosis, obesity, who presents to discuss ultrasound elastography findings of F3 fibrosis.  Right upper quadrant ultrasound showed fairly unremarkable liver parenchyma without significant steatosis or nodular contour.  Liver enzymes from 2025 were essentially all within normal limits.  Platelet count within normal limits and no stigmata of chronic liver disease on physical examination today.  His fib 4 score is 2.18 which is in the indeterminate range    Metabolic risk factors include HTN and Obesity. No significant alcohol use, recent weight gain or family history of liver disease    Plan  - Given discordant results between right upper quadrant ultrasound and elastography,  discussed MR elastography Vs ELF vs Liver biopsy. He agreed to start with ELF and rest  of management pending those results. If ELF showed advanced fibrosis then he may be a candidate for Rezdiffra  - Obtained chronic liver disease serologies below  - Plan for repeat elastography in 6 months  - Recommend Hepatitis B vaccination as he is non immune  - In the mean time, he will continue lifestyle changes for weight loss. He already started going to the gym since he retired        Orders:    Alpha 1 Antitrypsin Phenotype; Future    Alpha-1-antitrypsin; Future    Iron Panel (Includes Ferritin, Iron Sat%, Iron, and TIBC); Future    Anti-smooth muscle antibody, IgG; Future    Antimitochondrial antibody; Future    Ceruloplasmin; Future    US elastography/UGAP; Future    enhanced liver fibrosis (elf) score; Future    Gallbladder polyp  4mm gallbladder polyp seen on right upper quadrant ultrasound with other possible small polyps present.  Asymptomatic.  He is not from a high risk region and the polyp morphology does not have high risk features (sessile or gallbladder wall thickness)    - Since <5mm and only highrisk factor is age >60. Plan for surveillance US in 1yr           History of Present Illness   Lavon Carlos is a 70 y.o. male with a past medical history of hypertension, aortic valve stenosis, obesity, presents to hepatology clinic for further evaluation of fibrosis seen on ultrasound elastography.    Denies any significant alcohol use, tobacco use, IV drug use, recent weight lucio.  No family history of liver malignancy or liver disease.  Denies any recent abdominal pain, nausea, vomiting, jaundice or scleral icterus.      Right upper quadrant ultrasound 02/11/2025 showed mildly increased echogenicity in the liver with homogenous echotexture and no nodular contour or.  There was also incidental finding of 4 mm nonshadowing polyp with other nonshadowing echogenic foci suggestive of other polyps.  Ultrasound elastography in 02/03/2025 showed F3 fibrosis  HPI    Review of Systems A complete review of systems is negative other than that noted above in the HPI.      Current Outpatient Medications   Medication Sig Dispense Refill    amLODIPine (NORVASC) 10 mg tablet TAKE 1 TABLET DAILY 90 tablet 1    Arginine 1000 MG TABS Take 1,000 mg by mouth in the morning      Ascorbic Acid (Vitamin C) 500 MG CHEW       aspirin (ECOTRIN LOW STRENGTH) 81 mg EC tablet Take by mouth      glucosamine-chondroitin 500-400 MG tablet Take 1 tablet by mouth in the morning       irbesartan-hydrochlorothiazide (AVALIDE) 300-12.5 MG per tablet Take 1 tablet by mouth daily 90 tablet 3    Krill Oil 500 MG CAPS       Magnesium Citrate 100 MG CAPS Take 10,000 mcg by mouth daily      Misc Natural Products (BEET ROOT PO) Take 3 tablets by mouth if needed (Super Beets heart chews before workout)      Multiple Vitamins-Minerals (PRESERVISION AREDS 2+MULTI VIT PO) Take 1 tablet  by mouth Daily at 2am      rosuvastatin (CRESTOR) 20 MG tablet Take 1 tablet (20 mg total) by mouth daily 90 tablet 3    spironolactone (ALDACTONE) 25 mg tablet Take 1 tablet (25 mg total) by mouth every other day 45 tablet 3    VITAMIN D PO Take 10 mcg by mouth daily before dinner       No current facility-administered medications for this visit.     Objective   There were no vitals taken for this visit.    Physical Exam  Vitals and nursing note reviewed.   Constitutional:       General: He is not in acute distress.     Appearance: He is well-developed.   HENT:      Head: Normocephalic and atraumatic.   Eyes:      Conjunctiva/sclera: Conjunctivae normal.   Cardiovascular:      Rate and Rhythm: Normal rate and regular rhythm.      Heart sounds: No murmur heard.  Pulmonary:      Effort: Pulmonary effort is normal. No respiratory distress.      Breath sounds: Normal breath sounds.   Abdominal:      Palpations: Abdomen is soft.      Tenderness: There is no abdominal tenderness.   Musculoskeletal:         General: No swelling.      Cervical back: Neck supple.   Skin:     General: Skin is warm and dry.      Capillary Refill: Capillary refill takes less than 2 seconds.   Neurological:      Mental Status: He is alert.   Psychiatric:         Mood and Affect: Mood normal.            Lab Results: I personally reviewed relevant lab results.           Herminia Enriquez MD  Gastroenterology Fellow, PGY- 4  Available on EPIC Secure Chat  5/9/2025 9:15 AM

## 2025-05-09 NOTE — LETTER
May 9, 2025     Robert Budinetz, MD  [facility]     Patient: Lavon Carlos   YOB: 1955   Date of Visit: 2025       Dear Dr. Robert Budinetz, MD:    Thank you for referring Lavon Carlos to me for evaluation. Below are my notes for this consultation.    If you have questions, please do not hesitate to call me. I look forward to following your patient along with you.         Sincerely,        Enma Grant MD        CC: No Recipients    Herminia Enriquez MD  2025  9:16 AM  Attested  Name: Lavon Carlos      : 1955      MRN: [id number]  Encounter Provider: Enma Grant MD  Encounter Date: 2025   Encounter department: [facility] GASTROENTEROLOGY [address]  :  Assessment & Plan  Hepatic fibrosis  In summary, this is a 70-year-old male with history of HTN, aortic valve stenosis, obesity, who presents to discuss ultrasound elastography findings of F3 fibrosis.  Right upper quadrant ultrasound showed fairly unremarkable liver parenchyma without significant steatosis or nodular contour.  Liver enzymes from 2025 were essentially all within normal limits.  Platelet count within normal limits and no stigmata of chronic liver disease on physical examination today.  His fib 4 score is 2.18 which is in the indeterminate range    Metabolic risk factors include HTN and Obesity. No significant alcohol use, recent weight gain or family history of liver disease    Plan  - Given discordant results between right upper quadrant ultrasound and elastography,  discussed MR elastography Vs ELF vs Liver biopsy. He agreed to start with ELF and rest  of management pending those results. If ELF showed advanced fibrosis then he may be a candidate for Rezdiffra  - Obtained chronic liver disease serologies below  - Plan for repeat elastography in 6 months  - Recommend Hepatitis B vaccination as he is non immune  - In the mean time, he will continue lifestyle changes for weight  loss. He already started going to the gym since he retired        Orders:  •  Alpha 1 Antitrypsin Phenotype; Future  •  Alpha-1-antitrypsin; Future  •  Iron Panel (Includes Ferritin, Iron Sat%, Iron, and TIBC); Future  •  Anti-smooth muscle antibody, IgG; Future  •  Antimitochondrial antibody; Future  •  Ceruloplasmin; Future  •  US elastography/UGAP; Future  •  enhanced liver fibrosis (elf) score; Future    Gallbladder polyp  4mm gallbladder polyp seen on right upper quadrant ultrasound with other possible small polyps present. Asymptomatic.  He is not from a high risk region and the polyp morphology does not have high risk features (sessile or gallbladder wall thickness)    - Since <5mm and only highrisk factor is age >60. Plan for surveillance US in 1yr           History of Present Illness  Lavon Carlos is a 70 y.o. male with a past medical history of hypertension, aortic valve stenosis, obesity, presents to hepatology clinic for further evaluation of fibrosis seen on ultrasound elastography.    Denies any significant alcohol use, tobacco use, IV drug use, recent weight lucio.  No family history of liver malignancy or liver disease.  Denies any recent abdominal pain, nausea, vomiting, jaundice or scleral icterus.      Right upper quadrant ultrasound 02/11/2025 showed mildly increased echogenicity in the liver with homogenous echotexture and no nodular contour or.  There was also incidental finding of 4 mm nonshadowing polyp with other nonshadowing echogenic foci suggestive of other polyps.  Ultrasound elastography in 02/03/2025 showed F3 fibrosis  HPI    Review of Systems A complete review of systems is negative other than that noted above in the HPI.      Current Outpatient Medications   Medication Sig Dispense Refill   • amLODIPine (NORVASC) 10 mg tablet TAKE 1 TABLET DAILY 90 tablet 1   • Arginine 1000 MG TABS Take 1,000 mg by mouth in the morning     • Ascorbic Acid (Vitamin C) 500 MG CHEW      • aspirin  (ECOTRIN LOW STRENGTH) 81 mg EC tablet Take by mouth     • glucosamine-chondroitin 500-400 MG tablet Take 1 tablet by mouth in the morning      • irbesartan-hydrochlorothiazide (AVALIDE) 300-12.5 MG per tablet Take 1 tablet by mouth daily 90 tablet 3   • Krill Oil 500 MG CAPS      • Magnesium Citrate 100 MG CAPS Take 10,000 mcg by mouth daily     • Misc Natural Products (BEET ROOT PO) Take 3 tablets by mouth if needed (Super Beets heart chews before workout)     • Multiple Vitamins-Minerals (PRESERVISION AREDS 2+MULTI VIT PO) Take 1 tablet by mouth Daily at 2am     • rosuvastatin (CRESTOR) 20 MG tablet Take 1 tablet (20 mg total) by mouth daily 90 tablet 3   • spironolactone (ALDACTONE) 25 mg tablet Take 1 tablet (25 mg total) by mouth every other day 45 tablet 3   • VITAMIN D PO Take 10 mcg by mouth daily before dinner       No current facility-administered medications for this visit.     Objective  There were no vitals taken for this visit.    Physical Exam  Vitals and nursing note reviewed.   Constitutional:       General: He is not in acute distress.     Appearance: He is well-developed.   HENT:      Head: Normocephalic and atraumatic.   Eyes:      Conjunctiva/sclera: Conjunctivae normal.   Cardiovascular:      Rate and Rhythm: Normal rate and regular rhythm.      Heart sounds: No murmur heard.  Pulmonary:      Effort: Pulmonary effort is normal. No respiratory distress.      Breath sounds: Normal breath sounds.   Abdominal:      Palpations: Abdomen is soft.      Tenderness: There is no abdominal tenderness.   Musculoskeletal:         General: No swelling.      Cervical back: Neck supple.   Skin:     General: Skin is warm and dry.      Capillary Refill: Capillary refill takes less than 2 seconds.   Neurological:      Mental Status: He is alert.   Psychiatric:         Mood and Affect: Mood normal.            Lab Results: I personally reviewed relevant lab results.           Herminia Enriquez MD  Gastroenterology  Fellow, PGY- 4  Available on EPIC Secure Chat  5/9/2025 9:15 AM    Attestation signed by Enma Grant MD at 5/9/2025 11:16 AM:  Attending Attestation:    I reviewed the care furnished by the Resident/Fellow during the visit on 5/9/2025.  I was present in the office and personally saw and examined the patient.    70 year-old male with history of HTN, AVS, and class II obesity who presents for initial evaluation for advanced hepatic fibrosis.     He had a RUQ US in February 2025 which showed mild hepatic steatosis.  His liver chemistries, synthetic function and platelets are otherwise normal.  He had ultrasound elastography which showed LSM 7.44 kPA with IQR 10.3% suggestive of F3 disease.  His FIB 4 is 2.18 which is an intermediate result.     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 We discussed the natural history, prognosis, and complications of NAFLD, including progression to cirrhosis as well as risk for cardiovascular events. We discussed that weight loss as well as aggressive location of his metabolic risk factors looking for the progression of disease.  Would recommend ELF and repeat ultrasound elastography in 6 months to see there is improvement in fibrosis with lifestyle application.  If there is no improvement, can consider treatment with Rezdiffra if he has >F2 disease.     He will return to clinic clinic in 6 months or sooner if needed.  Thank you for the opportunity consult care.    Enma Grant MD   [facility]  Gastroenterology and Hepatology

## 2025-05-09 NOTE — PATIENT INSTRUCTIONS
Recommendations    - Obtain blood work as ordered    - Repeat ultrasound Elastography in 6 months    - Follow up with  hepatology in 7 months    - Obtain Hepatitis B vaccination    - Repeat regular ultrasound in 1 year for gallbladder polyp

## 2025-05-13 DIAGNOSIS — I10 ESSENTIAL HYPERTENSION: ICD-10-CM

## 2025-05-13 RX ORDER — SPIRONOLACTONE 25 MG/1
25 TABLET ORAL EVERY OTHER DAY
Qty: 45 TABLET | Refills: 3 | Status: SHIPPED | OUTPATIENT
Start: 2025-05-13

## 2025-05-13 RX ORDER — IRBESARTAN AND HYDROCHLOROTHIAZIDE 300; 12.5 MG/1; MG/1
1 TABLET, FILM COATED ORAL DAILY
Qty: 90 TABLET | Refills: 3 | Status: SHIPPED | OUTPATIENT
Start: 2025-05-13 | End: 2025-05-16 | Stop reason: SDUPTHER

## 2025-05-13 RX ORDER — AMLODIPINE BESYLATE 10 MG/1
10 TABLET ORAL DAILY
Qty: 90 TABLET | Refills: 3 | Status: SHIPPED | OUTPATIENT
Start: 2025-05-13 | End: 2025-05-16 | Stop reason: SDUPTHER

## 2025-05-14 RX ORDER — FERROUS SULFATE 325(65) MG
1000 TABLET ORAL DAILY
COMMUNITY

## 2025-05-16 ENCOUNTER — OFFICE VISIT (OUTPATIENT)
Dept: CARDIOLOGY CLINIC | Facility: CLINIC | Age: 70
End: 2025-05-16
Payer: COMMERCIAL

## 2025-05-16 VITALS
BODY MASS INDEX: 32.75 KG/M2 | DIASTOLIC BLOOD PRESSURE: 70 MMHG | HEART RATE: 65 BPM | TEMPERATURE: 98.9 F | OXYGEN SATURATION: 95 % | HEIGHT: 72 IN | SYSTOLIC BLOOD PRESSURE: 142 MMHG | WEIGHT: 241.8 LBS

## 2025-05-16 DIAGNOSIS — I10 ESSENTIAL HYPERTENSION: ICD-10-CM

## 2025-05-16 DIAGNOSIS — E78.2 MIXED HYPERLIPIDEMIA: ICD-10-CM

## 2025-05-16 DIAGNOSIS — I65.23 CAROTID ATHEROSCLEROSIS, BILATERAL: ICD-10-CM

## 2025-05-16 DIAGNOSIS — I25.10 CORONARY ARTERY DISEASE, NON-OCCLUSIVE: ICD-10-CM

## 2025-05-16 DIAGNOSIS — Q23.81 BICUSPID AORTIC VALVE: ICD-10-CM

## 2025-05-16 DIAGNOSIS — I35.0 MODERATE AORTIC VALVE STENOSIS: Primary | ICD-10-CM

## 2025-05-16 PROCEDURE — G2211 COMPLEX E/M VISIT ADD ON: HCPCS | Performed by: INTERNAL MEDICINE

## 2025-05-16 PROCEDURE — 99214 OFFICE O/P EST MOD 30 MIN: CPT | Performed by: INTERNAL MEDICINE

## 2025-05-16 RX ORDER — IRBESARTAN AND HYDROCHLOROTHIAZIDE 300; 12.5 MG/1; MG/1
1 TABLET, FILM COATED ORAL DAILY
Qty: 90 TABLET | Refills: 3 | Status: SHIPPED | OUTPATIENT
Start: 2025-05-16

## 2025-05-16 RX ORDER — AMLODIPINE BESYLATE 10 MG/1
10 TABLET ORAL DAILY
Qty: 90 TABLET | Refills: 3 | Status: SHIPPED | OUTPATIENT
Start: 2025-05-16

## 2025-05-16 NOTE — PROGRESS NOTES
[facility] [place]  [address]  [address]  [facility] [address] [phone]    Patient Name: Lavon Carlos  YOB: 1955 ;male  MR No: [id number]        Diagnosis ICD-10-CM Associated Orders   1. Moderate aortic valve stenosis  I35.0 Echo complete w/ contrast if indicated      2. Essential hypertension  I10 amLODIPine (NORVASC) 10 mg tablet      3. Coronary artery disease, non-occlusive  I25.10       4. Bicuspid aortic valve  Q23.81 Echo complete w/ contrast if indicated      5. Mixed hyperlipidemia  E78.2       6. Carotid atherosclerosis, bilateral  I65.23            Assessment and recommendations:    1. Moderate aortic valve stenosis  -     Echo complete w/ contrast if indicated; Future; Expected date: 09/10/2025  2. Essential hypertension  -     amLODIPine (NORVASC) 10 mg tablet; Take 1 tablet (10 mg total) by mouth daily  3. Coronary artery disease, non-occlusive  4. Bicuspid aortic valve  -     Echo complete w/ contrast if indicated; Future; Expected date: 09/10/2025  5. Mixed hyperlipidemia  6. Carotid atherosclerosis, bilateral       Most recent echocardiogram from September 2024 showed moderate aortic stenosis with mild concentric left ventricular hypertrophy and excellent left ventricular systolic function as well as mild mitral regurgitation and mildly enlarged aortic root.  We will continue to follow with an echocardiogram 6 to 12 months.  Recent left heart catheterization in January 2025 showed 30% proximal RCA, 50% mid circumflex, 30% mid LAD and 40% distal LAD stenosis.  No significant obstructive disease was identified.  Mean aortic valve gradient was 20 mmHg.  Continued medical therapy and surveillance was advised.  Patient reports that his blood pressure is now in the 120-130 range.  He is already on max dose of ARB and amlodipine.  We added alternate day spironolactone 25 mg daily.  AV rosalina blockers cannot be added due to  baseline slow heart rate.  His lipid profile showed LDL of 80 mg/dL and we had increased his rosuvastatin from 5 to 10 mg daily.  However most recent labs from April 2025 showed LDL of 92 and we increased his rosuvastatin to 20 mg daily.  He is tolerating that without any problem.  In view of findings of cirrhosis, we will avoid high-dose statins.  Most recent carotid duplex from January 2025 showed mild less than 50% bilateral carotid plaque.  Will continue to monitor and aim for LDL less than 70 mg/dL.  Return for follow-up in 6 months.      CHIEF COMPLAINT:      Bicuspid arctic valve, aortic stenosis, CAD, hypertension    HPI:   70-year-old male with past medical history significant for bicuspid arctic valve, moderate aortic stenosis, mild aortic regurgitation, mildly enlarged aortic root, hypertension, presents for routine follow-up visit.  Patient is doing well and remains very active with regular exercise and denies any chest pain, palpitations or syncope.  He only admits to mild dyspnea on moderate exertion.  Patient underwent treadmill stress test in January 2025 where he developed significant ST segment depression at 3 minutes into stress.  Patient did not complain of chest pain but did complain of dyspnea on exertion.  Cardiac catheterization showed mild triple-vessel CAD.   He is undergoing GI evaluation for findings of liver cirrhosis on his recent testing.  Past Medical History:   Diagnosis Date    Bicuspid aortic valve     Carotid stenosis     Hypertension     Uncomplicated. Well-controlled overall.          CURRENT  MEDICATIONS:    Current Medications[1]    ALLERGIES  No Known Allergies    Lab Results   Component Value Date    LDLCALC 92 04/03/2025    LDLCALC 87 01/03/2025    HDL 39 (L) 04/03/2025    HDL 40 01/03/2025    CHOLESTEROL 155 04/03/2025    CHOLESTEROL 151 01/03/2025    TRIG 120 04/03/2025    TRIG 120 01/03/2025    CREATININE 0.93 01/15/2025    CREATININE 0.91 03/13/2024    K 3.7 01/15/2025     K 3.8 03/13/2024    SODIUM 140 01/15/2025    SODIUM 138 03/13/2024         REVIEW OF SYSTEMS   Positive for: Dyspnea on moderate exertion.  Negative for: All remaining as reviewed below and in HPI.    SYSTEM SYMPTOMS REVIEWED:  General--weight change, fever, night sweats  Respiratory--cough, wheezing, shortness of breath, sputum production  Cardiovascular--chest pain, syncope, dyspnea on exertion, edema, decline in exercise tolerance, claudication   Gastrointestinal--persistent vomiting, diarrhea, abdominal distention, blood in stool   Muscular or skeletal--joint pain or swelling   Neurologic--headaches, syncope, abnormal movement  Hematologic--history of easy bruising and bleeding   Endocrine--thyroid enlargement, heat or cold intolerance, polyuria   Psychiatric--anxiety, depression     General physical examination:    General appearance: Alert, no acute distress, appears stated age.  Mild obesity, muscular build  HEENT: Mucous membranes are moist.  No obvious abnormality noted.  Neck: Supple with no lymphadenopathy.  No JVD.  Carotid pulses are intact.  Bilateral radiated aortic murmur.    Cardiovascular system: Regular rhythm.  Normal S1 and S2.  3/6 harsh systolic ejection murmur at the base radiating to both carotids.  No rubs or gallops. Extremities: No edema. No cyanosis.  Pulmonary: Respirations unlabored.  Good air entry bilaterally.  Clear to auscultation bilaterally.  Gastrointestinal: Abdomen is soft and nontender.  Bowel sounds are positive.  Musculoskeletal: Upper Extremities: Normal upper motor strength. Lower Extremity: Normal motor strength. Gait: Normal.   Skin: Skin is warm. No rashes or lesions.  Neurological: Patient is alert and oriented with no gross motor deficits.  Psychiatric: Mood is normal.  Behavior is normal.    Vitals:    05/16/25 1248   BP: 142/70   Pulse: 65   Temp: 98.9 °F (37.2 °C)   SpO2: 95%      Body mass index is 32.79 kg/m².  Wt Readings from Last 3 Encounters:   05/16/25  "110 kg (241 lb 12.8 oz)   05/09/25 109 kg (241 lb 6.4 oz)   02/17/25 111 kg (243 lb 12.8 oz)             Marciano Marte MD, [facility], [place]    Portions of the record  have been created with voice recognition software.  Occasional grammatical mistakes or wrong word or \"sound alike\" substitutions may have occurred due to the inherent limitations of voice recognition software. Please reach out to me directly for any clarifications.          [1]   Current Outpatient Medications:     amLODIPine (NORVASC) 10 mg tablet, Take 1 tablet (10 mg total) by mouth daily, Disp: 90 tablet, Rfl: 3    Arginine 1000 MG TABS, Take 1,000 mg by mouth in the morning, Disp: , Rfl:     Ascorbic Acid Buffered (Buffered Vitamin C) 1000 MG CAPS, Take 1,000 mg by mouth in the morning, Disp: , Rfl:     aspirin (ECOTRIN LOW STRENGTH) 81 mg EC tablet, Take by mouth, Disp: , Rfl:     glucosamine-chondroitin 500-400 MG tablet, Take 1 tablet by mouth in the morning, Disp: , Rfl:     irbesartan-hydrochlorothiazide (AVALIDE) 300-12.5 MG per tablet, Take 1 tablet by mouth daily, Disp: 90 tablet, Rfl: 3    Magnesium Citrate 100 MG CAPS, Take 10,000 mcg by mouth in the morning., Disp: , Rfl:     Misc Natural Products (BEET ROOT PO), Take 3 tablets by mouth if needed (Super Beets heart chews before workout), Disp: , Rfl:     Multiple Vitamins-Minerals (PRESERVISION AREDS 2+MULTI VIT PO), Take 1 tablet by mouth Daily at 2am, Disp: , Rfl:     Omega-3 Fatty Acids ([place] Oil) 1000 MG CAPS, Take 1,000 mg by mouth in the morning, Disp: , Rfl:     rosuvastatin (CRESTOR) 20 MG tablet, Take 1 tablet (20 mg total) by mouth daily, Disp: 90 tablet, Rfl: 3    spironolactone (ALDACTONE) 25 mg tablet, Take 1 tablet (25 mg total) by mouth every other day, Disp: 45 tablet, Rfl: 3    VITAMIN D PO, Take 10 mcg by mouth daily before dinner, Disp: , Rfl:     "

## 2025-06-06 ENCOUNTER — APPOINTMENT (OUTPATIENT)
Dept: LAB | Facility: HOSPITAL | Age: 70
End: 2025-06-06
Payer: COMMERCIAL

## 2025-06-06 DIAGNOSIS — K74.00 HEPATIC FIBROSIS: ICD-10-CM

## 2025-06-06 DIAGNOSIS — K76.0 METABOLIC DYSFUNCTION-ASSOCIATED STEATOTIC LIVER DISEASE (MASLD): ICD-10-CM

## 2025-06-06 LAB
FERRITIN SERPL-MCNC: 91 NG/ML (ref 30–336)
IGA SERPL-MCNC: 231 MG/DL (ref 66–433)
IGG SERPL-MCNC: 1251 MG/DL (ref 635–1741)
IGM SERPL-MCNC: 109 MG/DL (ref 45–281)
IRON SATN MFR SERPL: 31 % (ref 15–50)
IRON SERPL-MCNC: 109 UG/DL (ref 50–212)
TIBC SERPL-MCNC: 347.2 UG/DL (ref 250–450)
TRANSFERRIN SERPL-MCNC: 248 MG/DL (ref 203–362)
UIBC SERPL-MCNC: 238 UG/DL (ref 155–355)

## 2025-06-06 PROCEDURE — 82103 ALPHA-1-ANTITRYPSIN TOTAL: CPT

## 2025-06-06 PROCEDURE — 82104 ALPHA-1-ANTITRYPSIN PHENO: CPT

## 2025-06-06 PROCEDURE — 86225 DNA ANTIBODY NATIVE: CPT

## 2025-06-06 PROCEDURE — 86015 ACTIN ANTIBODY EACH: CPT

## 2025-06-06 PROCEDURE — 86038 ANTINUCLEAR ANTIBODIES: CPT

## 2025-06-06 PROCEDURE — 82784 ASSAY IGA/IGD/IGG/IGM EACH: CPT

## 2025-06-06 PROCEDURE — 82728 ASSAY OF FERRITIN: CPT

## 2025-06-06 PROCEDURE — 83550 IRON BINDING TEST: CPT

## 2025-06-06 PROCEDURE — 86381 MITOCHONDRIAL ANTIBODY EACH: CPT

## 2025-06-06 PROCEDURE — 36415 COLL VENOUS BLD VENIPUNCTURE: CPT

## 2025-06-06 PROCEDURE — 83540 ASSAY OF IRON: CPT

## 2025-06-07 LAB
ACTIN IGG SERPL-ACNC: 5 UNITS (ref 0–19)
MITOCHONDRIA M2 IGG SER-ACNC: <20 UNITS (ref 0–20)

## 2025-06-08 LAB
DSDNA IGG SERPL IA-ACNC: 1.6 IU/ML (ref ?–15)
NUCLEAR IGG SER IA-RTO: 0.4 RATIO (ref ?–1)

## 2025-06-09 LAB — LIVER FIBR SCORE SERPL CALC.FIBROSURE: 9.12

## 2025-06-10 ENCOUNTER — RESULTS FOLLOW-UP (OUTPATIENT)
Age: 70
End: 2025-06-10

## 2025-06-10 LAB
A1AT PHENOTYP SERPL IFE: NORMAL
A1AT SERPL-MCNC: 101 MG/DL (ref 101–187)

## 2025-07-30 ENCOUNTER — OFFICE VISIT (OUTPATIENT)
Dept: FAMILY MEDICINE CLINIC | Facility: CLINIC | Age: 70
End: 2025-07-30
Payer: COMMERCIAL

## 2025-07-30 VITALS
HEIGHT: 72 IN | BODY MASS INDEX: 32.21 KG/M2 | DIASTOLIC BLOOD PRESSURE: 76 MMHG | OXYGEN SATURATION: 98 % | WEIGHT: 237.8 LBS | SYSTOLIC BLOOD PRESSURE: 130 MMHG | HEART RATE: 62 BPM

## 2025-07-30 DIAGNOSIS — Z12.5 SCREENING FOR PROSTATE CANCER: ICD-10-CM

## 2025-07-30 DIAGNOSIS — E78.2 MIXED HYPERLIPIDEMIA: ICD-10-CM

## 2025-07-30 DIAGNOSIS — Z00.00 MEDICARE ANNUAL WELLNESS VISIT, SUBSEQUENT: Primary | ICD-10-CM

## 2025-07-30 DIAGNOSIS — I10 ESSENTIAL HYPERTENSION: ICD-10-CM

## 2025-07-30 PROCEDURE — G0439 PPPS, SUBSEQ VISIT: HCPCS | Performed by: FAMILY MEDICINE

## (undated) DEVICE — CATH DIAG 5FR .045 100CM FR4

## (undated) DEVICE — CATH GUIDE LAUNCHER 6FR EBU 3.5

## (undated) DEVICE — TR BAND RADIAL ARTERY COMPRESSION DEVICE: Brand: TR BAND

## (undated) DEVICE — GLIDESHEATH BASIC HYDROPHILIC COATED INTRODUCER SHEATH: Brand: GLIDESHEATH

## (undated) DEVICE — GUIDEWIRE WHOLEY HI TORQUE INTERM MOD J .035 145CM

## (undated) DEVICE — RADIFOCUS OPTITORQUE ANGIOGRAPHIC CATHETER: Brand: OPTITORQUE

## (undated) DEVICE — PRESSURE GUIDEWIRE: Brand: COMET™ II

## (undated) DEVICE — DGW .035 FC J3MM 260CM TEF: Brand: EMERALD